# Patient Record
Sex: MALE | Race: OTHER | URBAN - METROPOLITAN AREA
[De-identification: names, ages, dates, MRNs, and addresses within clinical notes are randomized per-mention and may not be internally consistent; named-entity substitution may affect disease eponyms.]

---

## 2022-08-10 ENCOUNTER — INPATIENT (INPATIENT)
Facility: HOSPITAL | Age: 27
LOS: 4 days | Discharge: PSYCHIATRIC OR SUBSTANCE USE FACILITY | End: 2022-08-15
Attending: STUDENT IN AN ORGANIZED HEALTH CARE EDUCATION/TRAINING PROGRAM | Admitting: STUDENT IN AN ORGANIZED HEALTH CARE EDUCATION/TRAINING PROGRAM

## 2022-08-10 VITALS
TEMPERATURE: 97 F | DIASTOLIC BLOOD PRESSURE: 82 MMHG | HEART RATE: 104 BPM | OXYGEN SATURATION: 99 % | SYSTOLIC BLOOD PRESSURE: 128 MMHG | RESPIRATION RATE: 18 BRPM

## 2022-08-10 DIAGNOSIS — F32.3 MAJOR DEPRESSIVE DISORDER, SINGLE EPISODE, SEVERE WITH PSYCHOTIC FEATURES: ICD-10-CM

## 2022-08-10 LAB
ALBUMIN SERPL ELPH-MCNC: 5 G/DL — SIGNIFICANT CHANGE UP (ref 3.3–5)
ALP SERPL-CCNC: 53 U/L — SIGNIFICANT CHANGE UP (ref 40–120)
ALT FLD-CCNC: 24 U/L — SIGNIFICANT CHANGE UP (ref 4–41)
AMPHET UR-MCNC: NEGATIVE — SIGNIFICANT CHANGE UP
ANION GAP SERPL CALC-SCNC: 14 MMOL/L — SIGNIFICANT CHANGE UP (ref 7–14)
APAP SERPL-MCNC: <10 UG/ML — LOW (ref 15–25)
APPEARANCE UR: ABNORMAL
AST SERPL-CCNC: 21 U/L — SIGNIFICANT CHANGE UP (ref 4–40)
BACTERIA # UR AUTO: NEGATIVE — SIGNIFICANT CHANGE UP
BARBITURATES UR SCN-MCNC: NEGATIVE — SIGNIFICANT CHANGE UP
BASOPHILS # BLD AUTO: 0.05 K/UL — SIGNIFICANT CHANGE UP (ref 0–0.2)
BASOPHILS NFR BLD AUTO: 0.7 % — SIGNIFICANT CHANGE UP (ref 0–2)
BENZODIAZ UR-MCNC: NEGATIVE — SIGNIFICANT CHANGE UP
BILIRUB SERPL-MCNC: 0.8 MG/DL — SIGNIFICANT CHANGE UP (ref 0.2–1.2)
BILIRUB UR-MCNC: NEGATIVE — SIGNIFICANT CHANGE UP
BUN SERPL-MCNC: 8 MG/DL — SIGNIFICANT CHANGE UP (ref 7–23)
CALCIUM SERPL-MCNC: 10.2 MG/DL — SIGNIFICANT CHANGE UP (ref 8.4–10.5)
CHLORIDE SERPL-SCNC: 103 MMOL/L — SIGNIFICANT CHANGE UP (ref 98–107)
CO2 SERPL-SCNC: 24 MMOL/L — SIGNIFICANT CHANGE UP (ref 22–31)
COCAINE METAB.OTHER UR-MCNC: NEGATIVE — SIGNIFICANT CHANGE UP
COLOR SPEC: YELLOW — SIGNIFICANT CHANGE UP
CREAT SERPL-MCNC: 1.01 MG/DL — SIGNIFICANT CHANGE UP (ref 0.5–1.3)
CREATININE URINE RESULT, DAU: 385 MG/DL — SIGNIFICANT CHANGE UP
DIFF PNL FLD: NEGATIVE — SIGNIFICANT CHANGE UP
EGFR: 106 ML/MIN/1.73M2 — SIGNIFICANT CHANGE UP
EOSINOPHIL # BLD AUTO: 0.1 K/UL — SIGNIFICANT CHANGE UP (ref 0–0.5)
EOSINOPHIL NFR BLD AUTO: 1.4 % — SIGNIFICANT CHANGE UP (ref 0–6)
EPI CELLS # UR: 0 /HPF — SIGNIFICANT CHANGE UP (ref 0–5)
ETHANOL SERPL-MCNC: <10 MG/DL — SIGNIFICANT CHANGE UP
FLUAV AG NPH QL: SIGNIFICANT CHANGE UP
FLUBV AG NPH QL: SIGNIFICANT CHANGE UP
GLUCOSE SERPL-MCNC: 90 MG/DL — SIGNIFICANT CHANGE UP (ref 70–99)
GLUCOSE UR QL: NEGATIVE — SIGNIFICANT CHANGE UP
HCT VFR BLD CALC: 50.6 % — HIGH (ref 39–50)
HGB BLD-MCNC: 16.9 G/DL — SIGNIFICANT CHANGE UP (ref 13–17)
HYALINE CASTS # UR AUTO: 2 /LPF — SIGNIFICANT CHANGE UP (ref 0–7)
IANC: 4.31 K/UL — SIGNIFICANT CHANGE UP (ref 1.8–7.4)
IMM GRANULOCYTES NFR BLD AUTO: 0.1 % — SIGNIFICANT CHANGE UP (ref 0–1.5)
KETONES UR-MCNC: ABNORMAL
LEUKOCYTE ESTERASE UR-ACNC: NEGATIVE — SIGNIFICANT CHANGE UP
LYMPHOCYTES # BLD AUTO: 2.12 K/UL — SIGNIFICANT CHANGE UP (ref 1–3.3)
LYMPHOCYTES # BLD AUTO: 29.4 % — SIGNIFICANT CHANGE UP (ref 13–44)
MCHC RBC-ENTMCNC: 28.8 PG — SIGNIFICANT CHANGE UP (ref 27–34)
MCHC RBC-ENTMCNC: 33.4 GM/DL — SIGNIFICANT CHANGE UP (ref 32–36)
MCV RBC AUTO: 86.3 FL — SIGNIFICANT CHANGE UP (ref 80–100)
METHADONE UR-MCNC: NEGATIVE — SIGNIFICANT CHANGE UP
MONOCYTES # BLD AUTO: 0.61 K/UL — SIGNIFICANT CHANGE UP (ref 0–0.9)
MONOCYTES NFR BLD AUTO: 8.5 % — SIGNIFICANT CHANGE UP (ref 2–14)
NEUTROPHILS # BLD AUTO: 4.31 K/UL — SIGNIFICANT CHANGE UP (ref 1.8–7.4)
NEUTROPHILS NFR BLD AUTO: 59.9 % — SIGNIFICANT CHANGE UP (ref 43–77)
NITRITE UR-MCNC: NEGATIVE — SIGNIFICANT CHANGE UP
NRBC # BLD: 0 /100 WBCS — SIGNIFICANT CHANGE UP
NRBC # FLD: 0 K/UL — SIGNIFICANT CHANGE UP
OPIATES UR-MCNC: NEGATIVE — SIGNIFICANT CHANGE UP
OXYCODONE UR-MCNC: NEGATIVE — SIGNIFICANT CHANGE UP
PCP SPEC-MCNC: SIGNIFICANT CHANGE UP
PCP UR-MCNC: NEGATIVE — SIGNIFICANT CHANGE UP
PH UR: 7 — SIGNIFICANT CHANGE UP (ref 5–8)
PLATELET # BLD AUTO: 219 K/UL — SIGNIFICANT CHANGE UP (ref 150–400)
POTASSIUM SERPL-MCNC: 4 MMOL/L — SIGNIFICANT CHANGE UP (ref 3.5–5.3)
POTASSIUM SERPL-SCNC: 4 MMOL/L — SIGNIFICANT CHANGE UP (ref 3.5–5.3)
PROT SERPL-MCNC: 6.9 G/DL — SIGNIFICANT CHANGE UP (ref 6–8.3)
PROT UR-MCNC: ABNORMAL
RBC # BLD: 5.86 M/UL — HIGH (ref 4.2–5.8)
RBC # FLD: 12.7 % — SIGNIFICANT CHANGE UP (ref 10.3–14.5)
RBC CASTS # UR COMP ASSIST: 4 /HPF — SIGNIFICANT CHANGE UP (ref 0–4)
RSV RNA NPH QL NAA+NON-PROBE: SIGNIFICANT CHANGE UP
SALICYLATES SERPL-MCNC: <0.3 MG/DL — LOW (ref 15–30)
SARS-COV-2 RNA SPEC QL NAA+PROBE: DETECTED
SODIUM SERPL-SCNC: 141 MMOL/L — SIGNIFICANT CHANGE UP (ref 135–145)
SP GR SPEC: 1.03 — SIGNIFICANT CHANGE UP (ref 1–1.05)
THC UR QL: NEGATIVE — SIGNIFICANT CHANGE UP
TOXICOLOGY SCREEN, DRUGS OF ABUSE, SERUM RESULT: SIGNIFICANT CHANGE UP
TSH SERPL-MCNC: 0.63 UIU/ML — SIGNIFICANT CHANGE UP (ref 0.27–4.2)
UROBILINOGEN FLD QL: ABNORMAL
WBC # BLD: 7.2 K/UL — SIGNIFICANT CHANGE UP (ref 3.8–10.5)
WBC # FLD AUTO: 7.2 K/UL — SIGNIFICANT CHANGE UP (ref 3.8–10.5)
WBC UR QL: 1 /HPF — SIGNIFICANT CHANGE UP (ref 0–5)

## 2022-08-10 PROCEDURE — 93010 ELECTROCARDIOGRAM REPORT: CPT | Mod: NC

## 2022-08-10 PROCEDURE — 99285 EMERGENCY DEPT VISIT HI MDM: CPT | Mod: GC

## 2022-08-10 PROCEDURE — 99285 EMERGENCY DEPT VISIT HI MDM: CPT | Mod: 25

## 2022-08-10 NOTE — ED BEHAVIORAL HEALTH ASSESSMENT NOTE - PRIMARY DX
Current severe episode of major depressive disorder with psychotic features, unspecified whether recurrent
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
## 2022-08-10 NOTE — ED ADULT NURSE NOTE - CHIEF COMPLAINT QUOTE
Pt with HX of depression and SI. Pt states was admitted to [facility] on 8/1. then d/cd. Pt states on 8/7 tried to over dose on risperidone. pt states not feeling well depressed , states  tried to hang himself in the past. pt feeling suicidal. Pt denies drug or alcohol use.
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
## 2022-08-10 NOTE — ED ADULT NURSE NOTE - OBJECTIVE STATEMENT
Received report from CHIQUI Acuna. Pt arrives to ED  from home c/o SI with SA. Pt was recently treated at [facility] for depression and prescribed Risperidone. Pt then tried to OD on the medication on Sunday, but did not go to hospital at that time. Pt states he wants to get help so he came to ED. Pt is calm and cooperative with ED staff. Pt endorses SI with a plan, has SA in the past, but denies HI, AH/VH/TH. Belongings secured in  locker

## 2022-08-10 NOTE — ED PROVIDER NOTE - PROGRESS NOTE DETAILS
HARJINDER Schwartz- discussed the case with hospitalist who accepted pt due to covid + , at this time [facility] unable accommodate him.

## 2022-08-10 NOTE — ED ADULT TRIAGE NOTE - CHIEF COMPLAINT QUOTE
Pt with HX of depression and SI. Pt states was admitted to [facility] on 8/1. then d/cd. Pt states on 8/7 tried to over dose on risperidone. pt states not feeling well depressed , states  tried to hang himself in the past. pt feeling suicidal. Pt denies drug or alcohol use.

## 2022-08-10 NOTE — ED BEHAVIORAL HEALTH ASSESSMENT NOTE - RISK ASSESSMENT
Moderate Acute Suicide Risk Static risks include two suicide attempts. Modifiable risks include not satisfied with current outpatient treatment, hopelessness, depressive episode, auditory hallucinations (no commands), unable to safety plan. Protective factors include help seeking, future orientation, supportive family, no prior psych admissions. Pt is at an acute moderately elevated risk and chronically elevated risk of suicide and would benefit from inpatient psychiatric admission at this time.

## 2022-08-10 NOTE — ED BEHAVIORAL HEALTH NOTE - BEHAVIORAL HEALTH NOTE
As per request of provider, writer contacted patient’s mother Cami Willis([phone]), to obtain collateral information.  The following information is per the brother.  Patient is a 27 YO male domiciled w/ mother, 2 brothers (21YO, 11YO) and father. No safety concerns reported for the 11 YO. Patient BIB by mother requested by the patient. Patient reports he wanted to be in a facility to get himself better and reports feeling afraid to feel how he felt on 08/07/22. Mother reports patient attempted suicide on 08/07/2022 and ingested 9x 1mG of risperidone and 5x 2MG of risperidone. Mother reports patients family member who is a child psych came over and saw the patient and recommended Benadryl and patient didn’t appear distressed.  Mother reports patient presents extremely sad and endorses SI. Mother reports patient endorses hearing voices. Patient went to [facility] on 8/01 and was observed for 24 hours for Si and AVH. Mother reports patient is not violent or aggressive. Patient was diagnosed with manic/depression at [facility] as per the mother. Patient has no prior psych hospitalizations. Mother reports one prior suicide attempt when patient was 10 years old. Trauma includes the passing of his sister when he was 8 years old. Mother unsure if patient is currently connected to a therapist. Patient is prescribed risperidone 2mg at night from [facility]. Recent stressors include the breakup of his girlfriend who lives in [place].  Patient recently travelled to [place] end of July to see her. No medical problems reported and patient is covid vaccinated x1 as of March 2021. No recent exposure to covid as per the mother. 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 Mother reports patient’s brother is diagnosed with bipolar disorder and was recently hospitalized at [facility]. Writer informed mother that patient would be admitted and is currently boarding. Patient’s father can also be updated if the mother does not answer. (cielo Willis [phone]) As per request of provider, writer contacted patient’s mother Cami Willis([phone]), to obtain collateral information.  The following information is per the brother.  Patient is a 25 YO male domiciled w/ mother, 2 brothers (19YO, 13YO) and father. No safety concerns reported for the 13 YO. Patient BIB by mother requested by the patient. Patient reports he wanted to be in a facility to get himself better and reports feeling afraid to feel how he felt on 08/07/22. Mother reports patient attempted suicide on 08/07/2022 and ingested 9x 1mG of risperidone and 5x 2MG of risperidone. Mother reports patients family member who is a child psych came over and saw the patient and recommended Benadryl and patient didn’t appear distressed.  Mother reports patient presents extremely sad and endorses SI. Mother reports patient endorses hearing voices. Patient went to [facility] on 8/01 and was observed for 24 hours for Si and AVH. Mother reports patient is not violent or aggressive. Patient was diagnosed with manic/depression at [facility] as per the mother. Patient has no prior psych hospitalizations. Mother reports one prior suicide attempt when patient was 10 years old. Trauma includes the passing of his sister when he was 8 years old. Mother unsure if patient is currently connected to a therapist. Patient is prescribed risperidone 2mg at night from [facility]. Recent stressors include the breakup of his girlfriend who lives in [place].  Patient recently travelled to [place] end of July to see her. No medical problems reported and patient is covid vaccinated x1 as of March 2021. No recent exposure to covid as per the mother. 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 Mother reports patient’s brother is diagnosed with bipolar disorder and was recently hospitalized at [facility]. Writer informed mother that patient would be admitted and is currently boarding. Patient’s father can also be updated if the mother does not answer. (cielo Willis [phone])    Writer informed mother of patient's admission to medicine.

## 2022-08-10 NOTE — ED BEHAVIORAL HEALTH ASSESSMENT NOTE - SUICIDE ATTEMPT:
You have influenza B  1. Drink plenty of fluids including Gatorade or other salts and sugar-containing fluids - go slow at first literally 1 sip every 15 minutes for the next 2 hours, then increase as tolerated  2. If any vomiting take 1 tablet of Zofran every 8 hours, after the Zofran if you have fevers take ibuprofen 800 mg with something that culture stomach.  3. If ongoing symptoms through Monday and to follow up with PCP        Influenza  Influenza (“the flu”) is an infection that affects your respiratory tract (the mouth, nose, and lungs, and the passages between them). Unlike a cold, the flu can make you very ill. And it can lead to pneumonia, a serious lung infection. For some people, especially older adults, young children, and people with certain chronic conditions, the flu can have serious complications and even be fatal.  What Are the Risk Factors for the Flu?     Viruses that cause influenza spread through the air in droplets when someone who has the flu coughs, sneezes, laughs, or talks.   Anyone can get the flu. But you’re more likely to become infected if you:  · Have a weakened immune system.  · Work in a health care setting where you may be exposed to flu germs.  · Live or work with someone who has the flu.  · Haven’t received an annual flu shot.  How Does the Flu Spread?  The flu is caused by viruses. The viruses spread through the air in droplets when someone who has the flu coughs, sneezes, laughs, or talks. You can become infected when you inhale these viruses directly. You can also become infected when you touch a surface on which the droplets have landed and then transfer the germs to your eyes, nose, or mouth. Touching used tissues, or sharing utensils, drinking glasses, or a toothbrush with an infected person can expose you to flu viruses, too.  What Are the Symptoms of the Flu?  Flu symptoms tend to come on quickly and may last a few days to a few weeks. They include:  · Fever usually  higher than 101°F  (38.3°C) and chills  · Sore throat and headache  · Dry cough  · Runny nose  · Tiredness and weakness  · Muscle aches  Factors That Can Make Flu Worse  For some people, the flu can be very serious. The risk of complications is greater for:  · Children under age 5.  · Adults 65 years of age and older.  · People with a chronic illness, such as diabetes or heart, kidney, or lung disease.  · People who live in a nursing home or long-term care facility.   How Is the Flu Treated?  Influenza usually improves after 7 days or so. In some cases, your health care provider may prescribe an antiviral medication. This may help you get well sooner. For the medication to help, you need to take it as soon as possible (ideally within 48 hours) after your symptoms start. If you develop pneumonia or other serious illness, hospital care may be needed.  Easing Flu Symptoms  · Drink lots of fluids such as water, juice, and warm soup. A good rule is to drink enough so that you urinate your normal amount.  · Get plenty of rest.  · Ask your health care provider what to take for fever and pain.  · Call your provider if your fever rises over 101°F (38.3°C) or you become dizzy, lightheaded, or short of breath.  Taking Steps to Protect Others  · Wash your hands often, especially after coughing or sneezing. Or, clean your hands with an alcohol-based hand  containing at least 60 percent alcohol.  · Cough or sneeze into a tissue. Then throw the tissue away and wash your hands. If you don’t have a tissue, cough and sneeze into the crook of your elbow.  · Stay home until at least 24 hours after you no longer have a fever or chills. Be sure the fever isn’t being hidden by fever-reducing medication.  · Don’t share food, utensils, drinking glasses, or a toothbrush with others.  · Ask your health care provider if others in your household should receive antiviral medication to help them avoid infection.  How Can the Flu Be  Prevented?  · One of the best ways to avoid the flu is to get a flu vaccination each year. Viruses that cause the flu change from year to year. For that reason, doctors recommend getting the flu vaccine each year, as soon as it's available in your area. The vaccine may be given as a shot or as a nasal spray. Your health care provider can tell you which vaccine is right for you.  · Wash your hands often. Frequent handwashing is a proven way to help prevent infection.  · Carry an alcohol-based hand gel containing at least 60 percent alcohol. Use it when you don’t have access to soap and water. Then wash your hands as soon as you can.  · Avoid touching your eyes, nose, and mouth.  · At home and work, clean phones, computer keyboards, and toys often with disinfectant wipes.  · If possible, avoid close contact with others who have the flu or symptoms of the flu.  Handwashing Tips  Handwashing is one of the best ways to prevent many common infections. If you’re caring for or visiting someone with the flu, wash your hands each time you enter and leave the room. Follow these steps:  · Use warm water and plenty of soap. Rub your hands together well.  · Clean the whole hand, under your nails, between your fingers, and up the wrists.  · Wash for at least 15 seconds.  · Rinse, letting the water run down your fingers, not up your wrists.  · Dry your hands well. Use a paper towel to turn off the faucet and open the door.  Using Alcohol-Based Hand   Alcohol-based hand  are also a good choice. Use them when you don’t have access to soap and water. Follow these steps:  · Squeeze about a tablespoon of gel into the palm of one hand.  · Rub your hands together briskly, cleaning the backs of your hands, the palms, between your fingers, and up the wrists.  · Rub until the gel is gone and your hands are completely dry.  Preventing Influenza in Healthcare Settings  The flu is a special concern for people in hospitals and  long-term care facilities. To help prevent the spread of flu, many hospitals and nursing homes take these steps:  · Health care providers wash their hands or use an alcohol-based hand  before and after treating each patient.  · People with the flu have private rooms and bathrooms or share a room with someone with the same infection.  · High-risk patients who don’t have the flu are encouraged to get the flu and pneumonia vaccines.  · All health care workers are encouraged or required to get flu shots.      © 8613-2089 [employer]. [address] [place] [address]. All rights reserved. This information is not intended as a substitute for professional medical care. Always follow your healthcare professional's instructions.         Yes past 3 months
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
## 2022-08-10 NOTE — ED PROVIDER NOTE - CARE PLAN
Principal Discharge DX:	2019 novel coronavirus disease (COVID-19)  Secondary Diagnosis:	Current severe episode of major depressive disorder with psychotic features   1

## 2022-08-10 NOTE — ED BEHAVIORAL HEALTH ASSESSMENT NOTE - DESCRIPTION
Calm and cooperative in the ED    Vital Signs Last 24 Hrs  T(C): 36.2 (10 Aug 2022 19:32), Max: 36.2 (10 Aug 2022 19:32)  T(F): 97.2 (10 Aug 2022 19:32), Max: 97.2 (10 Aug 2022 19:32)  HR: 104 (10 Aug 2022 19:32) (104 - 104)  BP: 128/82 (10 Aug 2022 19:32) (128/82 - 128/82)  BP(mean): --  RR: 18 (10 Aug 2022 19:32) (18 - 18)  SpO2: 99% (10 Aug 2022 19:32) (99% - 99%)    Parameters below as of 10 Aug 2022 19:32  Patient On (Oxygen Delivery Method): room air denies lives with parents, works as

## 2022-08-10 NOTE — ED BEHAVIORAL HEALTH NOTE - BEHAVIORAL HEALTH NOTE
COVID Exposure Screen- Patient    1.        *Have you had a COVID-19 test in the last 90 days?  (  ) Yes   ( x ) No   (  ) Unknown- Reason: _____    IF YES PROCEED TO QUESTION #2. IF NO OR UNKNOWN, PLEASE SKIP TO QUESTION #3.    2.          Date of test(s) and result(s): ________    3.        *Have you tested positive for COVID-19 antibodies? (  ) Yes   ( x ) No   (  ) Unknown- Reason: _____    IF YES PROCEED TO QUESTION #4. IF NO or UNKNOWN, PLEASE SKIP TO QUESTION #5.    4.        Date of positive antibody test: ________    5.        *Have you received 2 doses of the COVID-19 vaccine? ( x ) Yes   (  ) No   (  ) Unknown- Reason: _____    IF YES PROCEED TO QUESTION #6. IF NO or UNKNOWN, PLEASE SKIP TO QUESTION #7.    6.        Date of second dose: ________    7.        *In the past 10 days, have you been around anyone with a positive COVID-19 test?* ( x ) Yes   (  ) No   (  ) Unknown- Reason: ____    IF YES PROCEED TO QUESTION #8. IF NO or UNKNOWN, PLEASE SKIP TO QUESTION #13.    8.        Were you within 6 feet of them for at least 15 minutes? (  ) Yes   ( x ) No   (  ) Unknown- Reason: _____    9.        Have you provided care for them? (  ) Yes   ( x ) No   (  ) Unknown- Reason: ______    10.     Have you had direct physical contact with them (touched, hugged, or kissed them)? (  ) Yes   ( x ) No    (  ) Unknown- Reason: _____    11.     Have you shared eating or drinking utensils with them? (  ) Yes   ( x ) No    (  ) Unknown- Reason: ____    12.     Have they sneezed, coughed, or somehow gotten respiratory droplets on you? (  ) Yes   ( x ) No    (  ) Unknown- Reason: ______    13.     *Have you been out of [place] within the past 10 days?* ( x ) Yes   (  ) No   (  ) Unknown- Reason: _____    IF YES PLEASE ANSWER THE FOLLOWING QUESTIONS:    14.     Which state/country have you been to? [place]    15.     Were you there over 24 hours? ( x ) Yes   (  ) No    (  ) Unknown- Reason: ______    16.     Date of return to [facility]: 7/30/22
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
## 2022-08-11 DIAGNOSIS — Z29.9 ENCOUNTER FOR PROPHYLACTIC MEASURES, UNSPECIFIED: ICD-10-CM

## 2022-08-11 DIAGNOSIS — F33.3 MAJOR DEPRESSIVE DISORDER, RECURRENT, SEVERE WITH PSYCHOTIC SYMPTOMS: ICD-10-CM

## 2022-08-11 DIAGNOSIS — U07.1 COVID-19: ICD-10-CM

## 2022-08-11 DIAGNOSIS — R45.851 SUICIDAL IDEATIONS: ICD-10-CM

## 2022-08-11 LAB
COVID-19 SPIKE DOMAIN AB INTERP: POSITIVE
COVID-19 SPIKE DOMAIN ANTIBODY RESULT: >250 U/ML — HIGH
D DIMER BLD IA.RAPID-MCNC: 761 NG/ML DDU — HIGH
SARS-COV-2 IGG+IGM SERPL QL IA: >250 U/ML — HIGH
SARS-COV-2 IGG+IGM SERPL QL IA: POSITIVE

## 2022-08-11 PROCEDURE — 71045 X-RAY EXAM CHEST 1 VIEW: CPT | Mod: 26

## 2022-08-11 PROCEDURE — 12345: CPT | Mod: NC

## 2022-08-11 PROCEDURE — 99223 1ST HOSP IP/OBS HIGH 75: CPT | Mod: GC

## 2022-08-11 PROCEDURE — 93010 ELECTROCARDIOGRAM REPORT: CPT

## 2022-08-11 PROCEDURE — 99233 SBSQ HOSP IP/OBS HIGH 50: CPT

## 2022-08-11 RX ORDER — ENOXAPARIN SODIUM 100 MG/ML
80 INJECTION SUBCUTANEOUS EVERY 12 HOURS
Refills: 0 | Status: DISCONTINUED | OUTPATIENT
Start: 2022-08-11 | End: 2022-08-11

## 2022-08-11 RX ORDER — DIPHENHYDRAMINE HCL 50 MG
50 CAPSULE ORAL EVERY 6 HOURS
Refills: 0 | Status: DISCONTINUED | OUTPATIENT
Start: 2022-08-11 | End: 2022-08-15

## 2022-08-11 RX ORDER — ENOXAPARIN SODIUM 100 MG/ML
40 INJECTION SUBCUTANEOUS EVERY 24 HOURS
Refills: 0 | Status: DISCONTINUED | OUTPATIENT
Start: 2022-08-11 | End: 2022-08-11

## 2022-08-11 RX ORDER — ACETAMINOPHEN 500 MG
650 TABLET ORAL EVERY 6 HOURS
Refills: 0 | Status: DISCONTINUED | OUTPATIENT
Start: 2022-08-11 | End: 2022-08-15

## 2022-08-11 RX ORDER — LANOLIN ALCOHOL/MO/W.PET/CERES
3 CREAM (GRAM) TOPICAL AT BEDTIME
Refills: 0 | Status: DISCONTINUED | OUTPATIENT
Start: 2022-08-11 | End: 2022-08-12

## 2022-08-11 RX ORDER — ENOXAPARIN SODIUM 100 MG/ML
40 INJECTION SUBCUTANEOUS EVERY 12 HOURS
Refills: 0 | Status: DISCONTINUED | OUTPATIENT
Start: 2022-08-11 | End: 2022-08-13

## 2022-08-11 RX ADMIN — ENOXAPARIN SODIUM 40 MILLIGRAM(S): 100 INJECTION SUBCUTANEOUS at 17:48

## 2022-08-11 RX ADMIN — Medication 1 MILLIGRAM(S): at 22:06

## 2022-08-11 RX ADMIN — Medication 3 MILLIGRAM(S): at 22:06

## 2022-08-11 NOTE — H&P ADULT - NSICDXFAMILYHX_GEN_ALL_CORE_FT
FAMILY HISTORY:  Sibling  Still living? Unknown  Family history of bipolar disorder, Age at diagnosis: Age Unknown

## 2022-08-11 NOTE — PATIENT PROFILE ADULT - FALL HARM RISK - UNIVERSAL INTERVENTIONS
Bed in lowest position, wheels locked, appropriate side rails in place/Call bell, personal items and telephone in reach/Instruct patient to call for assistance before getting out of bed or chair/Non-slip footwear when patient is out of bed/[place] to call system/Physically safe environment - no spills, clutter or unnecessary equipment/Purposeful Proactive Rounding/Room/bathroom lighting operational, light cord in reach

## 2022-08-11 NOTE — H&P ADULT - NSHPREVIEWOFSYSTEMS_GEN_ALL_CORE
Constitutional: denies weight loss, fatigue, fevers, chills  Skin: denies rashes  HEENT: no vision or hearing changes  CV: denies BRAXTON or YANI  Resp: denies SOB or cough  GI: denies diarrhea, constipation, N/V, or changes to appetite  Urinary: denies urgency, dysuria  Neurologic: denies headache or pain  MSK: denies arthralgias or myalgias  Hematologic: denies bleeding or easy bruising  Lymphatics: denies LAD Constitutional: denies weight loss, fatigue, fevers, chills  Skin: denies rashes, ulcers  HEENT: no vision or hearing changes  CV: denies BRAXTON or YANI or CP  Resp: denies SOB or cough  GI: denies diarrhea, constipation, N/V, or changes to appetite  Urinary: denies urgency, dysuria  Neurologic: denies headache or LOC  MSK: denies arthralgias or myalgias  Hematologic: denies bleeding or easy bruising  Lymphatics: denies LAD
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
## 2022-08-11 NOTE — H&P ADULT - PROBLEM SELECTOR PLAN 3
DVT PPx: Improve Score low, encouraged ambulation/activity as possible  Diet: Regular  Code: Full code  Dispo: PT/OT Pending Hospital Course  Communication: mother (Cami [phone]), brother (Miguel [phone]) DVT PPx: Lovenox 40mg qD  Diet: Regular  Code: Full code  Dispo: no PT needed  Communication: mother (Cami [phone]), brother (Miguel [phone]) Prior contact at [place] 8/1 with 3 Covid+ patients. Asymptomatic at this time. Covid vax x1 3/2021.  - Not at high risk of progressing to severe COVID19 therefore not requiring Paxlovid or Remdesivir  - CTM for respiratory status/symptoms  - D-dimer AM labs  - CXR for baseline  - lovenox

## 2022-08-11 NOTE — BH CONSULTATION LIAISON PROGRESS NOTE - NSICDXBHSECONDARYDX_PSY_ALL_CORE
Current severe episode of major depressive disorder with psychotic features, unspecified whether recurrent   F32.3  Severe recurrent major depressive disorder with psychotic features   F33.3  Asymptomatic COVID-19 virus infection   U07.1  Prophylactic measure   Z29.9  Suicidal ideation   R45.851   Severe recurrent major depressive disorder with psychotic features   F33.3

## 2022-08-11 NOTE — H&P ADULT - PROBLEM SELECTOR PLAN 2
Prior contact at [place] 8/1 with 3 Covid+ patients. Asymptomatic at this time. Covid vax x1 3/2021.  - Not at high risk of progressing to severe COVID19 therefore not requiring Paxlovid or Remdesivir  - CTM for respiratory status/symptoms Prior contact at [place] 8/1 with 3 Covid+ patients. Asymptomatic at this time. Covid vax x1 3/2021.  - Not at high risk of progressing to severe COVID19 therefore not requiring Paxlovid or Remdesivir  - CTM for respiratory status/symptoms  - D-dimer AM labs  - CXR for baseline suicidal attempt with risperidone 8/7. No further side effects at this time  -constant observation  -psych following

## 2022-08-11 NOTE — H&P ADULT - NSHPLABSRESULTS_GEN_ALL_CORE
16.9   7.20  )-----------( 219      ( 10 Aug 2022 20:35 )             50.6     08-10    141  |  103  |  8   ----------------------------<  90  4.0   |  24  |  1.01    Ca    10.2      10 Aug 2022 20:35    TPro  6.9  /  Alb  5.0  /  TBili  0.8  /  DBili  x   /  AST  21  /  ALT  24  /  AlkPhos  53  08-10          Urinalysis Basic - ( 10 Aug 2022 20:35 )    Color: Yellow / Appearance: Slightly Turbid / S.030 / pH: x  Gluc: x / Ketone: Moderate  / Bili: Negative / Urobili: 6 mg/dL   Blood: x / Protein: 30 mg/dL / Nitrite: Negative   Leuk Esterase: Negative / RBC: 4 /HPF / WBC 1 /HPF   Sq Epi: x / Non Sq Epi: 0 /HPF / Bacteria: Negative 16.9   7.20  )-----------( 219      ( 10 Aug 2022 20:35 )             50.6     08-10    141  |  103  |  8   ----------------------------<  90  4.0   |  24  |  1.01    Ca    10.2      10 Aug 2022 20:35    TPro  6.9  /  Alb  5.0  /  TBili  0.8  /  DBili  x   /  AST  21  /  ALT  24  /  AlkPhos  53  08-10      Urinalysis Basic - ( 10 Aug 2022 20:35 )    Color: Yellow / Appearance: Slightly Turbid / S.030 / pH: x  Gluc: x / Ketone: Moderate  / Bili: Negative / Urobili: 6 mg/dL   Blood: x / Protein: 30 mg/dL / Nitrite: Negative   Leuk Esterase: Negative / RBC: 4 /HPF / WBC 1 /HPF   Sq Epi: x / Non Sq Epi: 0 /HPF / Bacteria: Negative      Additional testing:  ECG: NSR, NAD, RI and QRS intervals wnl, no acute morphologic changes noted. No prior EKG for comparison. Labs reviewed by me:    16.9   7.20  )-----------( 219      ( 10 Aug 2022 20:35 )             50.6     08-10    141  |  103  |  8   ----------------------------<  90  4.0   |  24  |  1.01    Ca    10.2      10 Aug 2022 20:35    TPro  6.9  /  Alb  5.0  /  TBili  0.8  /  DBili  x   /  AST  21  /  ALT  24  /  AlkPhos  53  08-10      Urinalysis Basic - ( 10 Aug 2022 20:35 )    Color: Yellow / Appearance: Slightly Turbid / S.030 / pH: x  Gluc: x / Ketone: Moderate  / Bili: Negative / Urobili: 6 mg/dL   Blood: x / Protein: 30 mg/dL / Nitrite: Negative   Leuk Esterase: Negative / RBC: 4 /HPF / WBC 1 /HPF   Sq Epi: x / Non Sq Epi: 0 /HPF / Bacteria: Negative      Additional testing:  ECG personally reviewed and interpreted: NSR, NV and QRS intervals wnl, no acute morphologic changes noted. No prior EKG for comparison.    CXR ordered and pending

## 2022-08-11 NOTE — PATIENT PROFILE ADULT - FALL HARM RISK - PATIENT NEEDS ASSISTANCE
Recent blood work done, they provided all the labs as below and they are normal. So no need to repeat those labs.   Fasting blood sugar  Total Cholesterol  LDL  HDL   No assistance needed

## 2022-08-11 NOTE — PROGRESS NOTE ADULT - SUBJECTIVE AND OBJECTIVE BOX
[facility] Division of Hospital Medicine  Roya Mayes DO  Available via MS Teams  In house pager [phone]     SUBJECTIVE / OVERNIGHT EVENTS:     ADDITIONAL REVIEW OF SYSTEMS:    MEDICATIONS  (STANDING):  enoxaparin Injectable 80 milliGRAM(s) SubCutaneous every 12 hours  melatonin 3 milliGRAM(s) Oral at bedtime    MEDICATIONS  (PRN):  acetaminophen     Tablet .. 650 milliGRAM(s) Oral every 6 hours PRN Temp greater or equal to 38C (100.4F), Mild Pain (1 - 3)      I&O's Summary    10 Aug 2022 07:01  -  11 Aug 2022 07:00  --------------------------------------------------------  IN: 200 mL / OUT: 0 mL / NET: 200 mL        PHYSICAL EXAM:  Vital Signs Last 24 Hrs  T(C): 36.2 (11 Aug 2022 13:43), Max: 36.7 (11 Aug 2022 01:47)  T(F): 97.2 (11 Aug 2022 13:43), Max: 98 (11 Aug 2022 01:47)  HR: 89 (11 Aug 2022 13:43) (89 - 104)  BP: 113/65 (11 Aug 2022 13:43) (109/78 - 147/90)  BP(mean): --  RR: 17 (11 Aug 2022 13:43) (16 - 18)  SpO2: 100% (11 Aug 2022 13:43) (99% - 100%)    Parameters below as of 11 Aug 2022 13:43  Patient On (Oxygen Delivery Method): room air      CONSTITUTIONAL: NAD, well-developed, well-groomed  EYES: PERRLA; conjunctiva and sclera clear  ENMT: Moist oral mucosa, no pharyngeal injection or exudates; normal dentition  NECK: Supple, no palpable masses; no thyromegaly  RESPIRATORY: Normal respiratory effort; lungs are clear to auscultation bilaterally  CARDIOVASCULAR: Regular rate and rhythm, normal S1 and S2, no murmur/rub/gallop; No lower extremity edema; Peripheral pulses are 2+ bilaterally  ABDOMEN: Nontender to palpation, normoactive bowel sounds, no rebound/guarding; No hepatosplenomegaly  MUSCULOSKELETAL:  Normal gait; no clubbing or cyanosis of digits; no joint swelling or tenderness to palpation  PSYCH: A+O to person, place, and time; affect appropriate  NEUROLOGY: CN 2-12 are intact and symmetric; no gross sensory deficits   SKIN: No rashes; no palpable lesions    LABS:                        16.9   7.20  )-----------( 219      ( 10 Aug 2022 20:35 )             50.6     08-10    141  |  103  |  8   ----------------------------<  90  4.0   |  24  |  1.01    Ca    10.2      10 Aug 2022 20:35    TPro  6.9  /  Alb  5.0  /  TBili  0.8  /  DBili  x   /  AST  21  /  ALT  24  /  AlkPhos  53  08-10          Urinalysis Basic - ( 10 Aug 2022 20:35 )    Color: Yellow / Appearance: Slightly Turbid / S.030 / pH: x  Gluc: x / Ketone: Moderate  / Bili: Negative / Urobili: 6 mg/dL   Blood: x / Protein: 30 mg/dL / Nitrite: Negative   Leuk Esterase: Negative / RBC: 4 /HPF / WBC 1 /HPF   Sq Epi: x / Non Sq Epi: 0 /HPF / Bacteria: Negative            RADIOLOGY & ADDITIONAL TESTS:  New Results Reviewed Today:   New Imaging Personally Reviewed Today:  New Electrocardiogram Personally Reviewed Today:  Prior or Outpatient Records Reviewed Today:    COMMUNICATION:  Care Discussed with Consultants/Other Providers and Details of Discussion:  Discussions with Patient/Family:  PCP Communication:     [facility] Division of Hospital Medicine  Roya Mayes DO  Available via MS Teams  In house pager [phone]     SUBJECTIVE / OVERNIGHT EVENTS: Pt asleep when entering room, states that he didn't get a single minute of sleep last night.  Describes that this was the first attempt that he carried out for suicide, later found out that his girlfriend broke up with him recently which also worsened his depression.  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  COVID wise, he denies fevers/chills, cough/SOB, CP       MEDICATIONS  (STANDING):  enoxaparin Injectable 80 milliGRAM(s) SubCutaneous every 12 hours  melatonin 3 milliGRAM(s) Oral at bedtime    MEDICATIONS  (PRN):  acetaminophen     Tablet .. 650 milliGRAM(s) Oral every 6 hours PRN Temp greater or equal to 38C (100.4F), Mild Pain (1 - 3)      I&O's Summary    10 Aug 2022 07:01  -  11 Aug 2022 07:00  --------------------------------------------------------  IN: 200 mL / OUT: 0 mL / NET: 200 mL        PHYSICAL EXAM:  Vital Signs Last 24 Hrs  T(C): 36.2 (11 Aug 2022 13:43), Max: 36.7 (11 Aug 2022 01:47)  T(F): 97.2 (11 Aug 2022 13:43), Max: 98 (11 Aug 2022 01:47)  HR: 89 (11 Aug 2022 13:43) (89 - 104)  BP: 113/65 (11 Aug 2022 13:43) (109/78 - 147/90)  BP(mean): --  RR: 17 (11 Aug 2022 13:43) (16 - 18)  SpO2: 100% (11 Aug 2022 13:43) (99% - 100%)    Parameters below as of 11 Aug 2022 13:43  Patient On (Oxygen Delivery Method): room air      CONSTITUTIONAL: NAD, well-developed, well-groomed  EYES: PERRLA; conjunctiva and sclera clear  RESPIRATORY: Normal respiratory effort; lungs are clear to auscultation bilaterally  CARDIOVASCULAR: Regular rate and rhythm, normal S1 and S2, no murmur/rub/gallop; No lower extremity edema; Peripheral pulses are 2+ bilaterally  ABDOMEN: Nontender to palpation, normoactive bowel sounds, no rebound/guarding  MUSCULOSKELETAL:  No clubbing or cyanosis of digits; no joint swelling or tenderness to palpation  PSYCH: A+O to person, place, and time; flat affect  NEUROLOGY: CN 2-12 are intact and symmetric; no gross sensory deficits   SKIN: No rashes; no palpable lesions    LABS:                        16.9   7.20  )-----------( 219      ( 10 Aug 2022 20:35 )             50.6     08-10    141  |  103  |  8   ----------------------------<  90  4.0   |  24  |  1.01    Ca    10.2      10 Aug 2022 20:35    TPro  6.9  /  Alb  5.0  /  TBili  0.8  /  DBili  x   /  AST  21  /  ALT  24  /  AlkPhos  53  08-10        Urinalysis Basic - ( 10 Aug 2022 20:35 )    Color: Yellow / Appearance: Slightly Turbid / S.030 / pH: x  Gluc: x / Ketone: Moderate  / Bili: Negative / Urobili: 6 mg/dL   Blood: x / Protein: 30 mg/dL / Nitrite: Negative   Leuk Esterase: Negative / RBC: 4 /HPF / WBC 1 /HPF   Sq Epi: x / Non Sq Epi: 0 /HPF / Bacteria: Negative    D-Dimer Assay, Quantitative (22 @ 06:50)    D-Dimer Assay, Quantitative: 761: A result less than 230 ng/mL DDU correlates with the absence of  thrombosis in a patient with low and moderate pre-test probability of  thrombosis.  Note: 1DDU is approximately equal to 2ng/mL FEU (previous unit).  If you have any questions, please contact Hematology Lab at ext. [phone]. ng/mL DDU          COMMUNICATION:  Care Discussed with Consultants/Other Providers and Details of Discussion:  Dr. Zapata  Discussions with Patient/Family: Mother Cami

## 2022-08-11 NOTE — H&P ADULT - ATTENDING COMMENTS
26M hx of MDD w/ psychotic features, PTSD, and recent suicide attempt admitted for asymptomatic COVID19+ status seeking transfer into [facility] for depression and SI treatment.    #Depression with psychotic features  Has AH without command hallucinations. Has SI and recent SA on 8/7. Amenable to admission to inpatient psych however COVID positive and [facility] unable to take at this time  -psych following  -constant observation    #Suicidal ideation  SA with risperidone. No rigidity or clonus on exam  -constant obs  -psych following    #COVID  Asymptomatic, not hypoxic. EKG NSR  -check D-dimer, CXR  -lovenox    #Prophylactic measure  Lovenox   Regular deit

## 2022-08-11 NOTE — PATIENT PROFILE ADULT - NSPROPOAPRESSUREINJURY_GEN_A_NUR
Last seen 10/02/2017, 'PLAN: Continue current cardiac medications.', with no future appointments scheduled; therefore, both prescriptions were e prescribed to patient's mail order pharmacy per refill protocol. Patient given 90 day supply with no refills with message to: Final script; for Future medication have [place] call [phone] to schedule an office visit with Dr Kruger.   no

## 2022-08-11 NOTE — H&P ADULT - NSHPPHYSICALEXAM_GEN_ALL_CORE
GENERAL: well-appearing, NAD, appears comfortable  HEENT: NC/AT, EOMI, no conjunctival icterus, moist mucus membranes  NECK: supple, non-tender, no JVD, no LAD  LUNGS: non-labored breathing, CTAB, no wheezing/rales/rhonchi  CV: RRR, no m/r/g, 2+ palpable peripheral pulses bi/l, cap refill <2 secs  ABD: non-distended, soft, non-tender, no rebound tenderness or guarding  MSK: full ROM, strength 5/5 bi/l  EXT: warm and well-perfused, no peripheral edema  SKIN: no rashes or lesions  NEURO: AAOx4, no focal deficits  PSYCH: depressed, normal affect, forward thinking, denies SI/HO or AH/VH currently GENERAL: well-appearing, NAD, appears comfortable, well-developed  HEENT: NC/AT, EOMI, no conjunctival icterus, moist mucus membranes  NECK: supple, non-tender, no JVD, no LAD  LUNGS: non-labored breathing, CTAB, no wheezing/rales/rhonchi  CV: RRR, no m/r/g, 2+ palpable peripheral pulses bi/l, cap refill <2 secs, no LE edema  ABD: non-distended, soft, non-tender, no rebound tenderness or guarding  MSK: full ROM, strength 5/5 bi/l  EXT: warm and well-perfused, no peripheral edema  SKIN: no rashes or lesions  NEURO: AAOx4, no focal deficits  PSYCH: depressed, normal affect, forward thinking, denies SI/HO or AH/VH currently

## 2022-08-11 NOTE — H&P ADULT - ASSESSMENT
26M hx of MDD w/ psychotic features, PTSD, and recent suicide attempt admitted for asymptomatic COVID19+ status seeking transfer into [facility] for depression treatment. 26M hx of MDD w/ psychotic features, PTSD, and recent suicide attempt admitted for asymptomatic COVID19+ status seeking transfer into [facility] for depression and SI treatment.

## 2022-08-11 NOTE — H&P ADULT - PROBLEM SELECTOR PLAN 4
DVT PPx: Lovenox 40mg qD  Diet: Regular  Code: Full code  Dispo: no PT needed  Communication: mother (Cami [phone]), brother (Miguel [phone])

## 2022-08-11 NOTE — BH CONSULTATION LIAISON PROGRESS NOTE - NSBHFUPINTERVALHXFT_PSY_A_CORE
Patient seen and evaluated, remains on CO for SI. Chart reviewed, case discussed . No psych PRN, Vitals stable.  Patient shares similar account as detailed in initial assessment. Reports he has been experiencing ongoing depressive symptoms including SI since February of this year, leading to a self aborted suicide attempt via trying to hang himself. Has not been hospitalized psychiatrically. Has only gone to [place] for treatment where he was never admitted and discharged on risperdal which is the same medication his brother with bipolar d/o takes.   Patient states that he was tired of feeling pain he has been carrying for so long and did not want to suffer any longer. But at the same time he did not want to die "I just wanted to put myself in a coma". He is happy that he lived at this time and currently denying SI.   He does report AH but reports that they are his own voice and make negative comments about himself. Currently denying AH.   In regard to overdose, patient reports he experienced muscle stiffness throughout his body, has taken benadryl throughout the week instructed by a family friend who is a psychiatrist. Overnight last night experienced some back stiffness/pain.     WIth regard to depressive symptoms, endorses depressed mood, impaired sleep, low energy. Denying manic and psychotic symptoms.

## 2022-08-11 NOTE — H&P ADULT - NSHPSOCIALHISTORY_GEN_ALL_CORE
Home: Domiciled with parents  Work:   ADL: Full ADL   Alcohol: socially  Smoking: denies   Drugs: marijuana 12/2021, none otherwise
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
## 2022-08-11 NOTE — H&P ADULT - PROBLEM SELECTOR PLAN 1
Progressively worsening depressive symptoms since 2/2022 with AH/VHs. No prior medical therapy, has had prior counseling in remote past given childhood traumas. Recently inpatient at [facility] 8/1-8/2 seeking treatment, discharged with Risperdal subsequently used for suicide attempt. Patient seeking self-admission to [facility] but Covid+ and pending bed at this time. Remote marijuana use (12/2021). Utox neg.  - plan to transfer/admit to [facility] when feasible given Covid+ status  - Psych following, recommending inpatient admission given patient's risk to self  - Not requiring 1:1 obvs  - melatonin 3mg Progressively worsening depressive symptoms since 2/2022 with AH/VHs. No prior medical therapy, has had prior counseling in remote past given childhood traumas. Recently inpatient at [facility] 8/1-8/2 seeking treatment, discharged with Risperdal subsequently used for suicide attempt. Patient seeking self-admission to [facility] but Covid+ and pending bed at this time. Remote marijuana use (12/2021). Utox neg.  - plan to transfer/admit to [facility] when feasible given Covid+ status  - Psych following, recommending inpatient admission given patient's risk to self  - 1:1 obvs while inpatient at [facility]  - melatonin 3mg

## 2022-08-11 NOTE — BH CONSULTATION LIAISON PROGRESS NOTE - OTHER
Bedside and Written shift change report given to Lily Lowry RN (oncoming nurse) by Abram Catherine RN (offgoing nurse). Report included the following information SBAR, Kardex, Intake/Output, MAR, Accordion, Recent Results, Med Rec Status and Cardiac Rhythm AV pace. Oddly related  Serious appearing  s/p SA

## 2022-08-12 LAB
24R-OH-CALCIDIOL SERPL-MCNC: 28.8 NG/ML — LOW (ref 30–80)
ALBUMIN SERPL ELPH-MCNC: 5 G/DL — SIGNIFICANT CHANGE UP (ref 3.3–5)
ALP SERPL-CCNC: 53 U/L — SIGNIFICANT CHANGE UP (ref 40–120)
ALT FLD-CCNC: 24 U/L — SIGNIFICANT CHANGE UP (ref 4–41)
ANION GAP SERPL CALC-SCNC: 13 MMOL/L — SIGNIFICANT CHANGE UP (ref 7–14)
AST SERPL-CCNC: 18 U/L — SIGNIFICANT CHANGE UP (ref 4–40)
BASOPHILS # BLD AUTO: 0.08 K/UL — SIGNIFICANT CHANGE UP (ref 0–0.2)
BASOPHILS NFR BLD AUTO: 1 % — SIGNIFICANT CHANGE UP (ref 0–2)
BILIRUB SERPL-MCNC: 0.9 MG/DL — SIGNIFICANT CHANGE UP (ref 0.2–1.2)
BUN SERPL-MCNC: 10 MG/DL — SIGNIFICANT CHANGE UP (ref 7–23)
CALCIUM SERPL-MCNC: 10.2 MG/DL — SIGNIFICANT CHANGE UP (ref 8.4–10.5)
CHLORIDE SERPL-SCNC: 98 MMOL/L — SIGNIFICANT CHANGE UP (ref 98–107)
CO2 SERPL-SCNC: 25 MMOL/L — SIGNIFICANT CHANGE UP (ref 22–31)
CREAT SERPL-MCNC: 1 MG/DL — SIGNIFICANT CHANGE UP (ref 0.5–1.3)
EGFR: 108 ML/MIN/1.73M2 — SIGNIFICANT CHANGE UP
EOSINOPHIL # BLD AUTO: 0.23 K/UL — SIGNIFICANT CHANGE UP (ref 0–0.5)
EOSINOPHIL NFR BLD AUTO: 2.9 % — SIGNIFICANT CHANGE UP (ref 0–6)
FERRITIN SERPL-MCNC: 223 NG/ML — SIGNIFICANT CHANGE UP (ref 30–400)
FOLATE SERPL-MCNC: 17.2 NG/ML — SIGNIFICANT CHANGE UP (ref 3.1–17.5)
GLUCOSE SERPL-MCNC: 90 MG/DL — SIGNIFICANT CHANGE UP (ref 70–99)
HCT VFR BLD CALC: 54.9 % — HIGH (ref 39–50)
HGB BLD-MCNC: 17.7 G/DL — HIGH (ref 13–17)
IANC: 2.93 K/UL — SIGNIFICANT CHANGE UP (ref 1.8–7.4)
IMM GRANULOCYTES NFR BLD AUTO: 0.3 % — SIGNIFICANT CHANGE UP (ref 0–1.5)
LDH SERPL L TO P-CCNC: 175 U/L — SIGNIFICANT CHANGE UP (ref 135–225)
LYMPHOCYTES # BLD AUTO: 3.81 K/UL — HIGH (ref 1–3.3)
LYMPHOCYTES # BLD AUTO: 48.5 % — HIGH (ref 13–44)
MAGNESIUM SERPL-MCNC: 2.3 MG/DL — SIGNIFICANT CHANGE UP (ref 1.6–2.6)
MCHC RBC-ENTMCNC: 27.8 PG — SIGNIFICANT CHANGE UP (ref 27–34)
MCHC RBC-ENTMCNC: 32.2 GM/DL — SIGNIFICANT CHANGE UP (ref 32–36)
MCV RBC AUTO: 86.3 FL — SIGNIFICANT CHANGE UP (ref 80–100)
MONOCYTES # BLD AUTO: 0.79 K/UL — SIGNIFICANT CHANGE UP (ref 0–0.9)
MONOCYTES NFR BLD AUTO: 10.1 % — SIGNIFICANT CHANGE UP (ref 2–14)
NEUTROPHILS # BLD AUTO: 2.93 K/UL — SIGNIFICANT CHANGE UP (ref 1.8–7.4)
NEUTROPHILS NFR BLD AUTO: 37.2 % — LOW (ref 43–77)
NRBC # BLD: 0 /100 WBCS — SIGNIFICANT CHANGE UP
NRBC # FLD: 0 K/UL — SIGNIFICANT CHANGE UP
PHOSPHATE SERPL-MCNC: 5 MG/DL — HIGH (ref 2.5–4.5)
PLATELET # BLD AUTO: 223 K/UL — SIGNIFICANT CHANGE UP (ref 150–400)
POTASSIUM SERPL-MCNC: 4.2 MMOL/L — SIGNIFICANT CHANGE UP (ref 3.5–5.3)
POTASSIUM SERPL-SCNC: 4.2 MMOL/L — SIGNIFICANT CHANGE UP (ref 3.5–5.3)
PROCALCITONIN SERPL-MCNC: 0.05 NG/ML — SIGNIFICANT CHANGE UP (ref 0.02–0.1)
PROT SERPL-MCNC: 7.8 G/DL — SIGNIFICANT CHANGE UP (ref 6–8.3)
RBC # BLD: 6.36 M/UL — HIGH (ref 4.2–5.8)
RBC # FLD: 12.6 % — SIGNIFICANT CHANGE UP (ref 10.3–14.5)
SARS-COV-2 RNA SPEC QL NAA+PROBE: SIGNIFICANT CHANGE UP
SODIUM SERPL-SCNC: 136 MMOL/L — SIGNIFICANT CHANGE UP (ref 135–145)
TSH SERPL-MCNC: 0.9 UIU/ML — SIGNIFICANT CHANGE UP (ref 0.27–4.2)
VIT B12 SERPL-MCNC: 724 PG/ML — SIGNIFICANT CHANGE UP (ref 200–900)
VIT D25+D1,25 OH+D1,25 PNL SERPL-MCNC: 44.7 PG/ML — SIGNIFICANT CHANGE UP (ref 19.9–79.3)
WBC # BLD: 7.86 K/UL — SIGNIFICANT CHANGE UP (ref 3.8–10.5)
WBC # FLD AUTO: 7.86 K/UL — SIGNIFICANT CHANGE UP (ref 3.8–10.5)

## 2022-08-12 PROCEDURE — 99233 SBSQ HOSP IP/OBS HIGH 50: CPT

## 2022-08-12 RX ORDER — LANOLIN ALCOHOL/MO/W.PET/CERES
3 CREAM (GRAM) TOPICAL
Refills: 0 | Status: DISCONTINUED | OUTPATIENT
Start: 2022-08-12 | End: 2022-08-15

## 2022-08-12 RX ORDER — RISPERIDONE 4 MG/1
1 TABLET ORAL AT BEDTIME
Refills: 0 | Status: DISCONTINUED | OUTPATIENT
Start: 2022-08-12 | End: 2022-08-15

## 2022-08-12 RX ADMIN — Medication 3 MILLIGRAM(S): at 20:55

## 2022-08-12 RX ADMIN — RISPERIDONE 1 MILLIGRAM(S): 4 TABLET ORAL at 20:55

## 2022-08-12 RX ADMIN — ENOXAPARIN SODIUM 40 MILLIGRAM(S): 100 INJECTION SUBCUTANEOUS at 05:28

## 2022-08-12 RX ADMIN — ENOXAPARIN SODIUM 40 MILLIGRAM(S): 100 INJECTION SUBCUTANEOUS at 17:37

## 2022-08-12 NOTE — DISCHARGE NOTE PROVIDER - NSDCFUADDAPPT_GEN_ALL_CORE_FT
Follow up with your primary doctor in 1 week   Follow up with Psychiatrist in during 1-3 days   after discharge from hospital
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
## 2022-08-12 NOTE — DISCHARGE NOTE PROVIDER - NSDCCPCAREPLAN_GEN_ALL_CORE_FT
PRINCIPAL DISCHARGE DIAGNOSIS  Diagnosis: Asymptomatic COVID-19 virus infection  Assessment and Plan of Treatment: YOU ARE TO BE QUARANTINED for 10 days from 8/10.   To prevent spread, please stay home and do not leave except to get medical care. Do not visit public areas. Avoid using public transportation, ride-sharing or taxis. Stay away from others as much as possible. "If this is not possible, try and keep a safe distance from other people (at least 6 feet away). Use a separate bathroom if available. Cover your mouth and nose with a tissue when you cough or sneeze. Wash your hands immediately after for at least 20 seconds. Wash your hands often. This is important after blowing your nose, coughing, sneezing, going to the bathroom, before eating or after preparing food. Avoid sharing dishes, drinking glasses, cups, eating utensils, towels or bedding with other people in your home. Clean and disinfect all high-touch surfaces everyday. High touch surfaces include phones, remote controls, counters, tabletops, doorknobs, bathroom fixtures, toilets, keyboards, tablets, and bedside tables.   If you develop emergency warning signs, such as high grade fever, shortness of breath, difficulty breathing, persistent pain, pressure in the chest, new confusion, bluish lips, or if any of your symptoms begin to worsen, call your medical provider or return to the nearest emergency department.        SECONDARY DISCHARGE DIAGNOSES  Diagnosis: Current severe episode of major depressive disorder with psychotic features  Assessment and Plan of Treatment: You were admitted with severe depression and evaluated in the hospital. Continue your medications as directed and follow up with your PCP and psychiatrist for further evaluation and medical management. If you are ever in need of immediate psychiatric assistance you may reach out to the [facility] [phone]      Diagnosis: Suicidal ideation  Assessment and Plan of Treatment: If you feel unhappy, depressed, anxious, fearful, [place], or in need of emotional help, call your Please follow up with your primary care physician regarding your hospitalization, Psychiatrist or 911.  Follow Psych recommendations, continue current medications as prescribed,       PRINCIPAL DISCHARGE DIAGNOSIS  Diagnosis: Current severe episode of major depressive disorder with psychotic features  Assessment and Plan of Treatment: Progressively worsening depressive symptoms since 2/2022 with /VHs. No prior medical therapy, has had prior counseling in remote past given childhood traumas. Recently inpatient at [facility] 8/1-8/2 seeking treatment, discharged with Risperdal subsequently used for suicide attempt. Patient seeking self-admission to [facility] but initially Covid+ but now repeat x 3 are negative.  Remote marijuana use (12/2021). Utox neg.  - Psych following, recommending inpatient admission given patient's risk to self  - 1:1 observation while inpatient at [facility]  - Melatonin 3mg.  Suicidal ideation.    suicidal attempt with risperidone 8/7. No further side effects at this time  -constant observation  -Care d/w psych: OK to resume risperdal 1mg   - PRN Ativan for agitation, PRN Benadryl for spasms.  -8/14 qtc 392            SECONDARY DISCHARGE DIAGNOSES  Diagnosis: 2019 novel coronavirus disease (COVID-19)  Assessment and Plan of Treatment: Asymptomatic COVID-19 virus infection.    Likely a false + admission though had prior contact at [facility] 8/1 with 3 Covid+ patients.  Asymptomatic at this time. Covid vax x1 3/2021.  tested negative for COVID x3

## 2022-08-12 NOTE — DISCHARGE NOTE PROVIDER - PROVIDER TOKENS
FREE:[LAST:[Your prmary],FIRST:[doctor],PHONE:[(   )    -],FAX:[(   )    -],ADDRESS:[follow up in 1 week]]

## 2022-08-12 NOTE — DISCHARGE NOTE PROVIDER - NSFOLLOWUPCLINICS_GEN_ALL_ED_FT
[facility]  Psychiatry  [address] [place] [address]  Phone: [phone]  Fax:      [facility]  Psychiatry  [address] [place] [address]  Phone: [phone]  Fax:     [facility]  Internal Medicine  [address] [place] [address]  Phone: [phone]  Fax: [phone]

## 2022-08-12 NOTE — DISCHARGE NOTE PROVIDER - HOSPITAL COURSE
26M hx of MDD w/ psychotic features, PTSD, and recent suicide attempt admitted for asymptomatic COVID19+ status seeking transfer into [facility] for depression and SI treatment.     Problem/Plan - 1:  ·  Problem: Severe recurrent major depressive disorder with psychotic features.   ·  Plan: Progressively worsening depressive symptoms since 2/2022 with AH/VHs. No prior medical therapy, has had prior counseling in remote past given childhood traumas. Recently inpatient at [facility] 8/1-8/2 seeking treatment, discharged with Risperdal subsequently used for suicide attempt. Patient seeking self-admission to [facility] but Covid+ and pending bed at this time. Remote marijuana use (12/2021). Utox neg.  - plan to transfer/admit to [facility] when feasible (COVID+ 8/10, repeat swab 8/11 negative)  - Psych following, recommending inpatient admission given patient's risk to self  - 1:1 obvs while inpatient at [facility]  - Melatonin 3mg.     Problem/Plan - 2:  ·  Problem: Suicidal ideation.   ·  Plan: suicidal attempt with risperidone 8/7. No further side effects at this time  -constant observation  -Care d/w psych: OK to resume risperdal 1mg tonight   - PRN Ativan for agitation, PRN Benadryl for spasms.     Problem/Plan - 3:  ·  Problem: Asymptomatic COVID-19 virus infection.   ·  Plan: Prior contact at [facility] 8/1 with 3 Covid+ patients.  Asymptomatic at this time. Covid vax x1 3/2021.  - Not at high risk of progressing to severe COVID19 therefore not requiring Paxlovid or Remdesivir  - CTM for respiratory status/symptoms  - D-dimer elevated to 760  - lovenox 40mg BID  - Will repeat COVID swab Sunday to confirm negative.     Problem/Plan - 4:  ·  Problem: Prophylactic measure.   ·  Plan: DVT PPx: Lovenox 40mg BID  Diet: Regular  Code: Full code  Dispo: no PT needed    On ___ this case was reviewed with  ____, the patient is medically stable and optimized for discharge. All medications were reviewed and prescriptions were sent to mutually agreed upon pharmacy. 26M hx of MDD w/ psychotic features, PTSD, and recent suicide attempt admitted for asymptomatic COVID19+ status seeking transfer into [facility] for depression and SI treatment.    Severe recurrent major depressive disorder with psychotic features.   Progressively worsening depressive symptoms since 2/2022 with /s. No prior medical therapy, has had prior counseling in remote past given childhood traumas. Recently inpatient at [facility] 8/1-8/2 seeking treatment, discharged with Risperdal subsequently used for suicide attempt. Patient seeking self-admission to [facility] but initially Covid+ but now repeat x 3 are negative. Pending  non-COVID bed at this time. Remote marijuana use (12/2021). Utox neg.  - Psych following, recommending inpatient admission given patient's risk to self  - 1:1 obvs while inpatient at [facility]  - Melatonin 3mg.    Suicidal ideation.    suicidal attempt with risperidone 8/7. No further side effects at this time  -constant observation  -Care d/w psych: OK to resume risperdal 1mg   - PRN Ativan for agitation, PRN Benadryl for spasms.  -8/14 qtc 392      Asymptomatic COVID-19 virus infection.    Likely a false + admission though had prior contact at [facility] 8/1 with 3 Covid+ patients.  Asymptomatic at this time. Covid vax x1 3/2021.  tested negative for COVID x3     Discussed with attending transfer to [facility] for further care          26M hx of MDD w/ psychotic features, PTSD, and recent suicide attempt admitted for asymptomatic COVID19+ status seeking transfer into [facility] for depression and SI treatment.    Severe recurrent major depressive disorder with psychotic features.   Progressively worsening depressive symptoms since 2/2022 with /VHs. No prior medical therapy, has had prior counseling in remote past given childhood traumas. Recently inpatient at [facility] 8/1-8/2 seeking treatment, discharged with Risperdal subsequently used for suicide attempt. Patient seeking self-admission to [facility] but initially Covid+ but now repeat x 3 are negative. Pending  non-COVID bed at this time. Remote marijuana use (12/2021). Utox neg.  - Psych following, recommending inpatient admission given patient's risk to self  - 1:1 obvs while inpatient at [facility]  - Melatonin 3mg.    Suicidal ideation.    suicidal attempt with risperidone 8/7. No further side effects at this time  -constant observation  -Care d/w psych: OK to resume risperdal 1mg   - PRN Ativan for agitation, PRN Benadryl for spasms.  -8/14 qtc 392      Asymptomatic COVID-19 virus infection.    Likely a false + admission though had prior contact at [facility] 8/1 with 3 Covid+ patients.  Asymptomatic at this time. Covid vax x1 3/2021.  tested negative for COVID x3     Discussed with attending transfer to [facility] for further care   Signed out to [facility] hospitalist

## 2022-08-12 NOTE — DISCHARGE NOTE PROVIDER - NSDCMRMEDTOKEN_GEN_ALL_CORE_FT
RisperDAL:    diphenhydrAMINE 50 mg oral capsule: 1 cap(s) orally every 6 hours, As needed, extrapyramidal sx  LORazepam 1 mg oral tablet: 1 tab(s) orally every 4 hours, As needed, anxiety/ agitation  melatonin 3 mg oral tablet: 1 tab(s) orally once a day (at bedtime) 20:00  risperiDONE 1 mg oral tablet: 1 tab(s) orally once a day (at bedtime)

## 2022-08-12 NOTE — PROGRESS NOTE ADULT - SUBJECTIVE AND OBJECTIVE BOX
[facility] Division of Hospital Medicine  Roya Mayes DO  Available via MS Teams  In house pager [phone]     SUBJECTIVE / OVERNIGHT EVENTS: Pt feels less rigid today, no further spasms.  Took 1 Ativan PO overnight for anxiety, motivated to get to [facility] for treatment.  Repeat COVID swab negative, is understanding that bed is taking time due to COVID.     MEDICATIONS  (STANDING):  enoxaparin Injectable 40 milliGRAM(s) SubCutaneous every 12 hours  melatonin 3 milliGRAM(s) Oral at bedtime  risperiDONE   Tablet 1 milliGRAM(s) Oral at bedtime    MEDICATIONS  (PRN):  acetaminophen     Tablet .. 650 milliGRAM(s) Oral every 6 hours PRN Temp greater or equal to 38C (100.4F), Mild Pain (1 - 3)  diphenhydrAMINE 50 milliGRAM(s) Oral every 6 hours PRN extrapyramidal sx  diphenhydrAMINE Injectable 50 milliGRAM(s) IntraMuscular every 6 hours PRN extrapyramidal sx  LORazepam     Tablet 1 milliGRAM(s) Oral every 4 hours PRN anxiety/ agitation      I&O's Summary    11 Aug 2022 07:01  -  12 Aug 2022 07:00  --------------------------------------------------------  IN: 400 mL / OUT: 0 mL / NET: 400 mL        PHYSICAL EXAM:  Vital Signs Last 24 Hrs  T(C): 36.6 (11 Aug 2022 21:42), Max: 36.6 (11 Aug 2022 21:42)  T(F): 97.8 (11 Aug 2022 21:42), Max: 97.8 (11 Aug 2022 21:42)  HR: 85 (11 Aug 2022 21:42) (85 - 85)  BP: 107/75 (11 Aug 2022 21:42) (107/75 - 107/75)  BP(mean): --  RR: 16 (11 Aug 2022 21:42) (16 - 16)  SpO2: 100% (11 Aug 2022 21:42) (100% - 100%)    Parameters below as of 11 Aug 2022 21:42  Patient On (Oxygen Delivery Method): room air      CONSTITUTIONAL: NAD, well-developed, well-groomed  EYES: PERRLA; conjunctiva and sclera clear  RESPIRATORY: Normal respiratory effort; lungs are clear to auscultation bilaterally  CARDIOVASCULAR: Regular rate and rhythm, normal S1 and S2, no murmur/rub/gallop; No lower extremity edema  ABDOMEN: Nontender to palpation, normoactive bowel sounds, no rebound/guarding  MUSCULOSKELETAL:  No clubbing or cyanosis of digits; no joint swelling or tenderness to palpation  PSYCH: A+O to person, place, and time; flat affect  NEUROLOGY: CN 2-12 are intact and symmetric; no gross sensory deficits   SKIN: No rashes; no palpable lesions    LABS:                        17.7   7.86  )-----------( 223      ( 12 Aug 2022 05:25 )             54.9     08-12    136  |  98  |  10  ----------------------------<  90  4.2   |  25  |  1.00    Ca    10.2      12 Aug 2022 05:25  Phos  5.0     08-12  Mg     2.30     08-12    TPro  7.8  /  Alb  5.0  /  TBili  0.9  /  DBili  x   /  AST  18  /  ALT  24  /  AlkPhos  53  08-12    Urinalysis Basic - ( 10 Aug 2022 20:35 )    Color: Yellow / Appearance: Slightly Turbid / S.030 / pH: x  Gluc: x / Ketone: Moderate  / Bili: Negative / Urobili: 6 mg/dL   Blood: x / Protein: 30 mg/dL / Nitrite: Negative   Leuk Esterase: Negative / RBC: 4 /HPF / WBC 1 /HPF   Sq Epi: x / Non Sq Epi: 0 /HPF / Bacteria: Negative      COVID-19 PCR: NotDetec (11 Aug 2022 21:36)      COMMUNICATION:  Care Discussed with Consultants/Other Providers and Details of Discussion:  Dr. Romano

## 2022-08-12 NOTE — BH CONSULTATION LIAISON PROGRESS NOTE - NSBHFUPINTERVALHXFT_PSY_A_CORE
Patient seen and evaluated, remains on CO for SI. Chart reviewed, case discussed . No psych PRN, Vitals stable.  He expresses frustration over being in medical hospital, initially stating he does not think he needs to be here or go to [facility] and can follow up with outpatient therapy. Discuss extensively patient's chronicity and more acute problems. He is amenable to inpatient psychiatry, signed voluntary form 9.13. Also discussed medication options with him and he is agreeable to retrial of risperdal. He currently is denying symptoms of depression including issues with sleep, reports he ate well this AM. Denying thoughts of hurting himself or wanting to die. Denies feelings of anxiety. Denying manic symptoms. Denying psychotic symptoms including AVH.  Denying symptoms of EPS    Collateral obtained from patient's mother [phone]: Patient seen and evaluated, remains on CO for SI. Chart reviewed, case discussed . No psych PRN, Vitals stable.  He expresses frustration over being in medical hospital, initially stating he does not think he needs to be here or go to [facility] and can follow up with outpatient therapy. Discuss extensively patient's chronicity and more acute problems. He is amenable to inpatient psychiatry, signed voluntary form 9.13. Also discussed medication options with him and he is agreeable to retrial of risperdal. He currently is denying symptoms of depression including issues with sleep, reports he ate well this AM. Denying thoughts of hurting himself or wanting to die. Denies feelings of anxiety. Denying manic symptoms. Denying psychotic symptoms including AVH.  Denying symptoms of EPS    Collateral obtained from patient's mother [phone] by Radha Rasmussen MS3 with permission obtained verbally from patient.   Spoke with pts mother this am. Mother stated that pt. put himself into therapy March earlier this year. Pt followed for a few months, but stopped in early June because his therapist reportedly made him feel "uncomfortable". The pt's mother endorsed a buildup of psychosocial stressors that exacerbated to the pt's symptoms, specifically a breakup with his girlfriend at the end of July. Mother states that pt told his family he was going to visit [place], [place] with his friends, which was not unusual. However, a few hours later he called his family stating he had flown to [place] to meet with is ex girlfriend. Upon returning home, the pt visited [facility] on 8/1 at the recommendation of their family friend Dr. Granado who is a child Psychiatrist 2/2 endorsing symptoms consistent with depression and psychosis. He was diagnosed with MDD with psychotic features, and prescribed risperidone 2mg q.h.s. On Sunday, the whole family went to a birthday brunch. When they returned home, they subsequently went to see in-laws, with the pt staying behind because he was feeling "sad" about this breakup. At this point in time, he took 5 x 2mg risperidone and 9x1mg risperidone from his brother's prescription.    Pt's mother is in agreement with and advocated for inpatient treatment at [facility]. Mother states her chief concern is that the pt "has to stick" with his treatment. Pt's parents plan to support their son and facilitate his med adherence. Pt's mother states she has previous experience with pt's brother who was recently hospitalized at [facility] and diagnosed with bipolar disorder. Mother states that she can be reached at any time.

## 2022-08-13 LAB
ANION GAP SERPL CALC-SCNC: 15 MMOL/L — HIGH (ref 7–14)
BUN SERPL-MCNC: 18 MG/DL — SIGNIFICANT CHANGE UP (ref 7–23)
CALCIUM SERPL-MCNC: 9.6 MG/DL — SIGNIFICANT CHANGE UP (ref 8.4–10.5)
CHLORIDE SERPL-SCNC: 101 MMOL/L — SIGNIFICANT CHANGE UP (ref 98–107)
CO2 SERPL-SCNC: 23 MMOL/L — SIGNIFICANT CHANGE UP (ref 22–31)
CREAT SERPL-MCNC: 0.92 MG/DL — SIGNIFICANT CHANGE UP (ref 0.5–1.3)
EGFR: 119 ML/MIN/1.73M2 — SIGNIFICANT CHANGE UP
GLUCOSE SERPL-MCNC: 89 MG/DL — SIGNIFICANT CHANGE UP (ref 70–99)
HCT VFR BLD CALC: 48.4 % — SIGNIFICANT CHANGE UP (ref 39–50)
HGB BLD-MCNC: 16.3 G/DL — SIGNIFICANT CHANGE UP (ref 13–17)
MAGNESIUM SERPL-MCNC: 2.1 MG/DL — SIGNIFICANT CHANGE UP (ref 1.6–2.6)
MCHC RBC-ENTMCNC: 28.8 PG — SIGNIFICANT CHANGE UP (ref 27–34)
MCHC RBC-ENTMCNC: 33.7 GM/DL — SIGNIFICANT CHANGE UP (ref 32–36)
MCV RBC AUTO: 85.5 FL — SIGNIFICANT CHANGE UP (ref 80–100)
NRBC # BLD: 0 /100 WBCS — SIGNIFICANT CHANGE UP
NRBC # FLD: 0 K/UL — SIGNIFICANT CHANGE UP
PHOSPHATE SERPL-MCNC: 4.1 MG/DL — SIGNIFICANT CHANGE UP (ref 2.5–4.5)
PLATELET # BLD AUTO: 186 K/UL — SIGNIFICANT CHANGE UP (ref 150–400)
POTASSIUM SERPL-MCNC: 4.2 MMOL/L — SIGNIFICANT CHANGE UP (ref 3.5–5.3)
POTASSIUM SERPL-SCNC: 4.2 MMOL/L — SIGNIFICANT CHANGE UP (ref 3.5–5.3)
RBC # BLD: 5.66 M/UL — SIGNIFICANT CHANGE UP (ref 4.2–5.8)
RBC # FLD: 12.6 % — SIGNIFICANT CHANGE UP (ref 10.3–14.5)
SARS-COV-2 RNA SPEC QL NAA+PROBE: SIGNIFICANT CHANGE UP
SODIUM SERPL-SCNC: 139 MMOL/L — SIGNIFICANT CHANGE UP (ref 135–145)
WBC # BLD: 6.7 K/UL — SIGNIFICANT CHANGE UP (ref 3.8–10.5)
WBC # FLD AUTO: 6.7 K/UL — SIGNIFICANT CHANGE UP (ref 3.8–10.5)

## 2022-08-13 PROCEDURE — 99232 SBSQ HOSP IP/OBS MODERATE 35: CPT

## 2022-08-13 RX ORDER — ENOXAPARIN SODIUM 100 MG/ML
40 INJECTION SUBCUTANEOUS EVERY 24 HOURS
Refills: 0 | Status: DISCONTINUED | OUTPATIENT
Start: 2022-08-14 | End: 2022-08-15

## 2022-08-13 RX ADMIN — ENOXAPARIN SODIUM 40 MILLIGRAM(S): 100 INJECTION SUBCUTANEOUS at 05:30

## 2022-08-13 RX ADMIN — Medication 3 MILLIGRAM(S): at 21:16

## 2022-08-13 RX ADMIN — RISPERIDONE 1 MILLIGRAM(S): 4 TABLET ORAL at 21:17

## 2022-08-13 NOTE — PROGRESS NOTE ADULT - SUBJECTIVE AND OBJECTIVE BOX
[facility] Division of [facility]  Roya Mayes DO  Available via MS Teams  In house pager [phone]     SUBJECTIVE / OVERNIGHT EVENTS: Pt resting, very tired, no URI  symptoms, no fevers/chills.  Anxiously awaiting [facility] bed.     MEDICATIONS  (STANDING):  enoxaparin Injectable 40 milliGRAM(s) SubCutaneous every 12 hours  risperiDONE   Tablet 1 milliGRAM(s) Oral at bedtime    MEDICATIONS  (PRN):  acetaminophen     Tablet .. 650 milliGRAM(s) Oral every 6 hours PRN Temp greater or equal to 38C (100.4F), Mild Pain (1 - 3)  diphenhydrAMINE 50 milliGRAM(s) Oral every 6 hours PRN extrapyramidal sx  diphenhydrAMINE Injectable 50 milliGRAM(s) IntraMuscular every 6 hours PRN extrapyramidal sx  LORazepam     Tablet 1 milliGRAM(s) Oral every 4 hours PRN anxiety/ agitation  melatonin 3 milliGRAM(s) Oral <User Schedule> PRN Sleep      I&O's Summary      PHYSICAL EXAM:  Vital Signs Last 24 Hrs  T(C): 36.6 (13 Aug 2022 05:00), Max: 36.6 (12 Aug 2022 12:30)  T(F): 97.9 (13 Aug 2022 05:00), Max: 97.9 (13 Aug 2022 05:00)  HR: 62 (13 Aug 2022 05:00) (62 - 75)  BP: 121/70 (13 Aug 2022 05:00) (108/74 - 122/73)  BP(mean): --  RR: 16 (13 Aug 2022 05:00) (16 - 16)  SpO2: 99% (13 Aug 2022 05:00) (98% - 100%)    Parameters below as of 13 Aug 2022 05:00  Patient On (Oxygen Delivery Method): room air    PE Deferred today    LABS:                        16.3   6.70  )-----------( 186      ( 13 Aug 2022 07:00 )             48.4     08-13    139  |  101  |  18  ----------------------------<  89  4.2   |  23  |  0.92    Ca    9.6      13 Aug 2022 07:00  Phos  4.1     08-13  Mg     2.10     08-13    TPro  7.8  /  Alb  5.0  /  TBili  0.9  /  DBili  x   /  AST  18  /  ALT  24  /  AlkPhos  53  08-12              COVID-19 PCR: NotDetec (12 Aug 2022 21:13)  COVID-19 PCR: NotDete (11 Aug 2022 21:36)

## 2022-08-13 NOTE — CHART NOTE - NSCHARTNOTEFT_GEN_A_CORE
extensive discussion with patient, he would like assistance with his mental health issues but does not feel it is to his benefit to remain in the hospital waiting for an inpatient psych bed, he rather leave and find help elsewhere.  Discussed with psych - pt cannot sign himself out of the hospital, if needed will 2 PC on Monday.  Mr Willis updated and would like to receive treatment as soon as possible.  He is calm and cooperative at this time.  Dr Mayes aware extensive discussion with patient, he would like assistance with his mental health issues but does not feel it is to his benefit to remain in the hospital waiting for an inpatient psych bed, he rather leave and find help elsewhere.  Discussed with psych - pt cannot sign himself out of the hospital, risk to self, if needed will 2 PC on Monday.  Mr Willis updated and would like to receive treatment as soon as possible.  He is calm and cooperative at this time.  Dr Mayes aware

## 2022-08-13 NOTE — BH CONSULTATION LIAISON PROGRESS NOTE - NSBHFUPINTERVALHXFT_PSY_A_CORE
Patient seen and evaluated, remains on CO for SI. Chart reviewed, case discussed . No psych PRN, Vitals stable.    Patient continues to express frustration over being in medical hospital, wondering when he can get a bed and be transferred.   He currently is denying symptoms of depression including issues with sleep, reports he ate well this AM. Denying thoughts of hurting himself or wanting to die. Denies feelings of anxiety. Denying manic symptoms. Denying psychotic symptoms including AVH. Denying symptoms of EPS. Denies medication side effects. While he was initially amenable to inpatient psychiatry, signed voluntary form 9.13, he is beginning to express worsening frustration to treatment team as the day went on, demanding discharge home.
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
## 2022-08-14 LAB
ANION GAP SERPL CALC-SCNC: 13 MMOL/L — SIGNIFICANT CHANGE UP (ref 7–14)
BUN SERPL-MCNC: 14 MG/DL — SIGNIFICANT CHANGE UP (ref 7–23)
CALCIUM SERPL-MCNC: 9.8 MG/DL — SIGNIFICANT CHANGE UP (ref 8.4–10.5)
CHLORIDE SERPL-SCNC: 102 MMOL/L — SIGNIFICANT CHANGE UP (ref 98–107)
CO2 SERPL-SCNC: 24 MMOL/L — SIGNIFICANT CHANGE UP (ref 22–31)
CREAT SERPL-MCNC: 0.83 MG/DL — SIGNIFICANT CHANGE UP (ref 0.5–1.3)
EGFR: 125 ML/MIN/1.73M2 — SIGNIFICANT CHANGE UP
GLUCOSE SERPL-MCNC: 87 MG/DL — SIGNIFICANT CHANGE UP (ref 70–99)
HCT VFR BLD CALC: 51.5 % — HIGH (ref 39–50)
HGB BLD-MCNC: 16.5 G/DL — SIGNIFICANT CHANGE UP (ref 13–17)
MAGNESIUM SERPL-MCNC: 2 MG/DL — SIGNIFICANT CHANGE UP (ref 1.6–2.6)
MCHC RBC-ENTMCNC: 27.9 PG — SIGNIFICANT CHANGE UP (ref 27–34)
MCHC RBC-ENTMCNC: 32 GM/DL — SIGNIFICANT CHANGE UP (ref 32–36)
MCV RBC AUTO: 87.1 FL — SIGNIFICANT CHANGE UP (ref 80–100)
NRBC # BLD: 0 /100 WBCS — SIGNIFICANT CHANGE UP
NRBC # FLD: 0 K/UL — SIGNIFICANT CHANGE UP
PHOSPHATE SERPL-MCNC: 4 MG/DL — SIGNIFICANT CHANGE UP (ref 2.5–4.5)
PLATELET # BLD AUTO: 202 K/UL — SIGNIFICANT CHANGE UP (ref 150–400)
POTASSIUM SERPL-MCNC: 4 MMOL/L — SIGNIFICANT CHANGE UP (ref 3.5–5.3)
POTASSIUM SERPL-SCNC: 4 MMOL/L — SIGNIFICANT CHANGE UP (ref 3.5–5.3)
RBC # BLD: 5.91 M/UL — HIGH (ref 4.2–5.8)
RBC # FLD: 12.4 % — SIGNIFICANT CHANGE UP (ref 10.3–14.5)
SARS-COV-2 RNA SPEC QL NAA+PROBE: SIGNIFICANT CHANGE UP
SODIUM SERPL-SCNC: 139 MMOL/L — SIGNIFICANT CHANGE UP (ref 135–145)
WBC # BLD: 5.79 K/UL — SIGNIFICANT CHANGE UP (ref 3.8–10.5)
WBC # FLD AUTO: 5.79 K/UL — SIGNIFICANT CHANGE UP (ref 3.8–10.5)

## 2022-08-14 PROCEDURE — 99232 SBSQ HOSP IP/OBS MODERATE 35: CPT

## 2022-08-14 RX ADMIN — Medication 3 MILLIGRAM(S): at 21:18

## 2022-08-14 RX ADMIN — Medication 650 MILLIGRAM(S): at 16:57

## 2022-08-14 RX ADMIN — ENOXAPARIN SODIUM 40 MILLIGRAM(S): 100 INJECTION SUBCUTANEOUS at 06:48

## 2022-08-14 RX ADMIN — RISPERIDONE 1 MILLIGRAM(S): 4 TABLET ORAL at 21:24

## 2022-08-14 RX ADMIN — Medication 650 MILLIGRAM(S): at 17:30

## 2022-08-14 NOTE — PROGRESS NOTE ADULT - SUBJECTIVE AND OBJECTIVE BOX
[facility] Division of Hospital Medicine  Roya MayesDO  Available via MS Teams  In house pager [phone]     SUBJECTIVE / OVERNIGHT EVENTS: Pt understandably frustrated that he's in the hospital and not at [facility] where he can received the treatment he needs.  REmains afebrile, nontoxic, sitting up in chair.  Also getting frustrated because he's getting mixed messages, was able to have a pencil and paper few days ago but was taken away from the night team.        MEDICATIONS  (STANDING):  enoxaparin Injectable 40 milliGRAM(s) SubCutaneous every 24 hours  risperiDONE   Tablet 1 milliGRAM(s) Oral at bedtime    MEDICATIONS  (PRN):  acetaminophen     Tablet .. 650 milliGRAM(s) Oral every 6 hours PRN Temp greater or equal to 38C (100.4F), Mild Pain (1 - 3)  diphenhydrAMINE 50 milliGRAM(s) Oral every 6 hours PRN extrapyramidal sx  diphenhydrAMINE Injectable 50 milliGRAM(s) IntraMuscular every 6 hours PRN extrapyramidal sx  LORazepam     Tablet 1 milliGRAM(s) Oral every 4 hours PRN anxiety/ agitation  melatonin 3 milliGRAM(s) Oral <User Schedule> PRN Sleep      I&O's Summary      PHYSICAL EXAM:  Vital Signs Last 24 Hrs  T(C): 36.2 (14 Aug 2022 06:29), Max: 36.8 (13 Aug 2022 16:33)  T(F): 97.1 (14 Aug 2022 06:29), Max: 98.2 (13 Aug 2022 16:33)  HR: 57 (14 Aug 2022 06:29) (57 - 73)  BP: 112/64 (14 Aug 2022 06:29) (109/66 - 112/64)  BP(mean): --  RR: 17 (14 Aug 2022 06:29) (16 - 17)  SpO2: 99% (14 Aug 2022 06:29) (98% - 100%)    Parameters below as of 14 Aug 2022 06:29  Patient On (Oxygen Delivery Method): room air      PE: deferred, unchanged     LABS:                        16.5   5.79  )-----------( 202      ( 14 Aug 2022 06:35 )             51.5     08-14    139  |  102  |  14  ----------------------------<  87  4.0   |  24  |  0.83    Ca    9.8      14 Aug 2022 06:35  Phos  4.0     08-14  Mg     2.00     08-14                COVID-19 PCR: NotDetec (12 Aug 2022 21:13)  COVID-19 PCR: NotDetec (11 Aug 2022 21:36)

## 2022-08-14 NOTE — PROVIDER CONTACT NOTE (OTHER) - ASSESSMENT
pt resting in bed; on a 1:1; asymptomatic; pt insisted when he was in 531 he had all of these taken away and when transferred to 510 all were given back to him; as per patient psych doctor said it is okay and day ANM; however mentioned to ANM [place] and according to policy we have to remove everything
Dr Mayes says patient is allowed to have pencils and paper as long as PCA is in room.

## 2022-08-14 NOTE — PROVIDER CONTACT NOTE (OTHER) - SITUATION
Patient on 1:1 for SI, allowed pencils and paper
at change of shift noticed the remote control, call bell, medication tray in drawer, and pencil were within reach of patient

## 2022-08-14 NOTE — BH CONSULTATION LIAISON PROGRESS NOTE - NSBHFUPINTERVALHXFT_PSY_A_CORE
Patient seen and evaluated, remains on CO for SI. Chart reviewed, case discussed. No psych PRNs required.    Patient continues to express frustration over being in medical hospital, wondering when he can get a bed and be transferred. Expresses frustration over what he says have been inconsistencies with what objects is allowed and not allowed to have. Extensive psychoeducation provided. Reports good mood, denies SIIP/HIIP.
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
## 2022-08-15 ENCOUNTER — INPATIENT (INPATIENT)
Facility: HOSPITAL | Age: 27
LOS: 2 days | Discharge: HOME | End: 2022-08-18
Attending: PSYCHIATRY & NEUROLOGY | Admitting: PSYCHIATRY & NEUROLOGY
Payer: COMMERCIAL

## 2022-08-15 VITALS — WEIGHT: 179.9 LBS | HEIGHT: 68 IN | TEMPERATURE: 98 F

## 2022-08-15 VITALS
DIASTOLIC BLOOD PRESSURE: 73 MMHG | RESPIRATION RATE: 17 BRPM | HEART RATE: 84 BPM | SYSTOLIC BLOOD PRESSURE: 127 MMHG | TEMPERATURE: 98 F | OXYGEN SATURATION: 99 %

## 2022-08-15 DIAGNOSIS — F33.9 MAJOR DEPRESSIVE DISORDER, RECURRENT, UNSPECIFIED: ICD-10-CM

## 2022-08-15 PROBLEM — F32.A DEPRESSION, UNSPECIFIED: Chronic | Status: ACTIVE | Noted: 2022-08-10

## 2022-08-15 PROBLEM — F43.10 POST-TRAUMATIC STRESS DISORDER, UNSPECIFIED: Chronic | Status: ACTIVE | Noted: 2022-08-10

## 2022-08-15 PROCEDURE — 99233 SBSQ HOSP IP/OBS HIGH 50: CPT

## 2022-08-15 PROCEDURE — 99232 SBSQ HOSP IP/OBS MODERATE 35: CPT

## 2022-08-15 PROCEDURE — 99221 1ST HOSP IP/OBS SF/LOW 40: CPT | Mod: GC

## 2022-08-15 RX ORDER — RISPERIDONE 4 MG/1
1 TABLET ORAL
Qty: 0 | Refills: 0 | DISCHARGE
Start: 2022-08-15

## 2022-08-15 RX ORDER — DIPHENHYDRAMINE HCL 50 MG
1 CAPSULE ORAL
Qty: 0 | Refills: 0 | DISCHARGE
Start: 2022-08-15

## 2022-08-15 RX ORDER — DIPHENHYDRAMINE HCL 50 MG
50 CAPSULE ORAL ONCE
Refills: 0 | Status: DISCONTINUED | OUTPATIENT
Start: 2022-08-15 | End: 2022-08-18

## 2022-08-15 RX ORDER — RISPERIDONE 4 MG/1
0 TABLET ORAL
Qty: 0 | Refills: 0 | DISCHARGE

## 2022-08-15 RX ORDER — HALOPERIDOL DECANOATE 100 MG/ML
5 INJECTION INTRAMUSCULAR ONCE
Refills: 0 | Status: DISCONTINUED | OUTPATIENT
Start: 2022-08-15 | End: 2022-08-18

## 2022-08-15 RX ORDER — LANOLIN ALCOHOL/MO/W.PET/CERES
3 CREAM (GRAM) TOPICAL AT BEDTIME
Refills: 0 | Status: DISCONTINUED | OUTPATIENT
Start: 2022-08-15 | End: 2022-08-18

## 2022-08-15 RX ORDER — LANOLIN ALCOHOL/MO/W.PET/CERES
1 CREAM (GRAM) TOPICAL
Qty: 0 | Refills: 0 | DISCHARGE
Start: 2022-08-15

## 2022-08-15 RX ORDER — RISPERIDONE 4 MG/1
1 TABLET ORAL AT BEDTIME
Refills: 0 | Status: DISCONTINUED | OUTPATIENT
Start: 2022-08-15 | End: 2022-08-16

## 2022-08-15 RX ORDER — DIPHENHYDRAMINE HCL 50 MG
50 CAPSULE ORAL EVERY 6 HOURS
Refills: 0 | Status: DISCONTINUED | OUTPATIENT
Start: 2022-08-15 | End: 2022-08-18

## 2022-08-15 RX ORDER — HALOPERIDOL DECANOATE 100 MG/ML
5 INJECTION INTRAMUSCULAR EVERY 4 HOURS
Refills: 0 | Status: DISCONTINUED | OUTPATIENT
Start: 2022-08-15 | End: 2022-08-18

## 2022-08-15 RX ADMIN — RISPERIDONE 1 MILLIGRAM(S): 4 TABLET ORAL at 20:29
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
## 2022-08-15 NOTE — PROGRESS NOTE ADULT - PROBLEM SELECTOR PROBLEM 1
Severe recurrent major depressive disorder with psychotic features

## 2022-08-15 NOTE — BH CONSULTATION LIAISON PROGRESS NOTE - NSBHCHARTREVIEWLAB_PSY_A_CORE FT
16.5   5.79  )-----------( 202      ( 14 Aug 2022 06:35 )             51.5   08-14    139  |  102  |  14  ----------------------------<  87  4.0   |  24  |  0.83    Ca    9.8      14 Aug 2022 06:35  Phos  4.0     08-14  Mg     2.00     08-14    
                      17.7   7.86  )-----------( 223      ( 12 Aug 2022 05:25 )             54.9   08-12    136  |  98  |  10  ----------------------------<  90  4.2   |  25  |  1.00    Ca    10.2      12 Aug 2022 05:25  Phos  5.0     08-12  Mg     2.30     08-12    TPro  7.8  /  Alb  5.0  /  TBili  0.9  /  DBili  x   /  AST  18  /  ALT  24  /  AlkPhos  53  08-12  
denies street drug use/caffeine
                      16.9   7.20  )-----------( 219      ( 10 Aug 2022 20:35 )             50.6   08-10    141  |  103  |  8   ----------------------------<  90  4.0   |  24  |  1.01    Ca    10.2      10 Aug 2022 20:35    TPro  6.9  /  Alb  5.0  /  TBili  0.8  /  DBili  x   /  AST  21  /  ALT  24  /  AlkPhos  53  08-10  
                      17.7   7.86  )-----------( 223      ( 12 Aug 2022 05:25 )             54.9   08-12    136  |  98  |  10  ----------------------------<  90  4.2   |  25  |  1.00    Ca    10.2      12 Aug 2022 05:25  Phos  5.0     08-12  Mg     2.30     08-12    TPro  7.8  /  Alb  5.0  /  TBili  0.9  /  DBili  x   /  AST  18  /  ALT  24  /  AlkPhos  53  08-12  
                      17.7   7.86  )-----------( 223      ( 12 Aug 2022 05:25 )             54.9   08-12    136  |  98  |  10  ----------------------------<  90  4.2   |  25  |  1.00    Ca    10.2      12 Aug 2022 05:25  Phos  5.0     08-12  Mg     2.30     08-12    TPro  7.8  /  Alb  5.0  /  TBili  0.9  /  DBili  x   /  AST  18  /  ALT  24  /  AlkPhos  53  08-12

## 2022-08-15 NOTE — BH INPATIENT PSYCHIATRY ASSESSMENT NOTE - NSBHCHARTREVIEWINVESTIGATE_PSY_A_CORE FT
< from: 12 Lead ECG (08.10.22 @ 21:35) >      Ventricular Rate 75 BPM    Atrial Rate 75 BPM    P-R Interval 160 ms    QRS Duration 86 ms    Q-T Interval 344 ms    QTC Calculation(Bazett) 384 ms    P Axis 64 degrees    R Axis 57 degrees    T Axis 47 degrees    Diagnosis Line Normal sinus rhythm  Normal ECG      < from: Xray Chest 1 View AP/PA (08.11.22 @ 16:00) >    ACC: [id number] EXAM:  XR CHEST AP OR PA 1V                          PROCEDURE DATE:  08/11/2022          INTERPRETATION:  CLINICAL INFORMATION: Covid positive with cough    Time of Exam:  August 11, 2022 at 3:15 PM    EXAM:  Frontal Chest    FINDINGS:  Both lungs are equally aerated and free of any focal abnormalities. The   heart is not enlarged and there is no effusion.        COMPARISON:  None Available        IMPRESSION: Clear lungs.

## 2022-08-15 NOTE — BH CONSULTATION LIAISON PROGRESS NOTE - NSBHATTESTTYPEVISIT_PSY_A_CORE
Resident/Fellow with telephonic supervision
Attending with Resident/Fellow/Student
Resident/Fellow with telephonic supervision
Attending with Resident/Fellow/Student
Attending with Resident/Fellow/Student
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
## 2022-08-15 NOTE — PROGRESS NOTE ADULT - SUBJECTIVE AND OBJECTIVE BOX
[facility] Division of Hospital Medicine  Link Lopez DO  Pager (M-F, 8A-5P): [phone]      Patient is a 26y old  Male who presents with a chief complaint of MDD w/ psychosis Covid+ status (14 Aug 2022 11:09)      SUBJECTIVE / OVERNIGHT EVENTS: no overnight events   ADDITIONAL REVIEW OF SYSTEMS:    MEDICATIONS  (STANDING):  enoxaparin Injectable 40 milliGRAM(s) SubCutaneous every 24 hours  risperiDONE   Tablet 1 milliGRAM(s) Oral at bedtime    MEDICATIONS  (PRN):  acetaminophen     Tablet .. 650 milliGRAM(s) Oral every 6 hours PRN Temp greater or equal to 38C (100.4F), Mild Pain (1 - 3)  diphenhydrAMINE 50 milliGRAM(s) Oral every 6 hours PRN extrapyramidal sx  diphenhydrAMINE Injectable 50 milliGRAM(s) IntraMuscular every 6 hours PRN extrapyramidal sx  LORazepam     Tablet 1 milliGRAM(s) Oral every 4 hours PRN anxiety/ agitation  melatonin 3 milliGRAM(s) Oral <User Schedule> PRN Sleep      CAPILLARY BLOOD GLUCOSE        I&O's Summary      PHYSICAL EXAM:  Vital Signs Last 24 Hrs  T(C): 36.3 (15 Aug 2022 05:53), Max: 36.9 (14 Aug 2022 12:03)  T(F): 97.4 (15 Aug 2022 05:53), Max: 98.4 (14 Aug 2022 12:03)  HR: 83 (14 Aug 2022 12:03) (83 - 83)  BP: 109/69 (15 Aug 2022 05:53) (109/69 - 123/70)  BP(mean): --  RR: 18 (15 Aug 2022 05:53) (18 - 18)  SpO2: 98% (15 Aug 2022 05:53) (98% - 100%)    Parameters below as of 15 Aug 2022 05:53  Patient On (Oxygen Delivery Method): room air      CONSTITUTIONAL: NAD, well-developed, well-groomed  EYES: EOMI; conjunctiva and sclera clear  ENMT: Moist oral mucosa, no pharyngeal injection or exudates; normal dentition  NECK: Supple, no palpable masses; no thyromegaly  RESPIRATORY: Normal respiratory effort; lungs are clear to auscultation bilaterally  CARDIOVASCULAR: Regular rate and rhythm, normal S1 and S2, no murmur/rub/gallop; No lower extremity edema; Peripheral pulses are 2+ bilaterally  ABDOMEN: Nontender to palpation, normoactive bowel sounds, no rebound/guarding; No hepatosplenomegaly  MUSKULOSKELETAL:  no clubbing or cyanosis of digits; no joint swelling or tenderness to palpation  PSYCH: A+O to person, place, and time; affect appropriate  NEUROLOGY: CN 2-12 are intact and symmetric; no gross sensory deficits;   SKIN: No rashes; no palpable lesions    LABS:                        16.5   5.79  )-----------( 202      ( 14 Aug 2022 06:35 )             51.5     08-14    139  |  102  |  14  ----------------------------<  87  4.0   |  24  |  0.83    Ca    9.8      14 Aug 2022 06:35  Phos  4.0     08-14  Mg     2.00     08-14                  RADIOLOGY & ADDITIONAL TESTS:  Results Reviewed:   Imaging Personally Reviewed:  Electrocardiogram Personally Reviewed:    COORDINATION OF CARE:  Care Discussed with Consultants/Other Providers [Y/N]: Y  Prior or Outpatient Records Reviewed [Y/N]: Y   [facility] Division of Hospital Medicine  Link LopezDO  Pager (M-F, 8A-5P): [phone]      Patient is a 26y old  Male who presents with a chief complaint of MDD w/ psychosis Covid+ status (14 Aug 2022 11:09)      SUBJECTIVE / OVERNIGHT EVENTS: no overnight events, pt sitting in chair on doorway, eager to go to [facility]   ADDITIONAL REVIEW OF SYSTEMS: no cp/sob     MEDICATIONS  (STANDING):  enoxaparin Injectable 40 milliGRAM(s) SubCutaneous every 24 hours  risperiDONE   Tablet 1 milliGRAM(s) Oral at bedtime    MEDICATIONS  (PRN):  acetaminophen     Tablet .. 650 milliGRAM(s) Oral every 6 hours PRN Temp greater or equal to 38C (100.4F), Mild Pain (1 - 3)  diphenhydrAMINE 50 milliGRAM(s) Oral every 6 hours PRN extrapyramidal sx  diphenhydrAMINE Injectable 50 milliGRAM(s) IntraMuscular every 6 hours PRN extrapyramidal sx  LORazepam     Tablet 1 milliGRAM(s) Oral every 4 hours PRN anxiety/ agitation  melatonin 3 milliGRAM(s) Oral <User Schedule> PRN Sleep      CAPILLARY BLOOD GLUCOSE        I&O's Summary      PHYSICAL EXAM:  Vital Signs Last 24 Hrs  T(C): 36.3 (15 Aug 2022 05:53), Max: 36.9 (14 Aug 2022 12:03)  T(F): 97.4 (15 Aug 2022 05:53), Max: 98.4 (14 Aug 2022 12:03)  HR: 83 (14 Aug 2022 12:03) (83 - 83)  BP: 109/69 (15 Aug 2022 05:53) (109/69 - 123/70)  BP(mean): --  RR: 18 (15 Aug 2022 05:53) (18 - 18)  SpO2: 98% (15 Aug 2022 05:53) (98% - 100%)    Parameters below as of 15 Aug 2022 05:53  Patient On (Oxygen Delivery Method): room air      CONSTITUTIONAL: NAD, well appearing   HEENT: EOMI, MMM  RESPIRATORY: Normal respiratory effort; lungs are clear to auscultation bilaterally  CARDIOVASCULAR: Regular rate and rhythm, normal S1 and S2, no murmurs  ABDOMEN: Nontender to palpation, normoactive bowel sounds  MUSKULOSKELETAL:  no clubbing or cyanosis of digits; no joint swelling or tenderness to palpation  PSYCH: calm, cooperative   NEUROLOGY: CN 2-12 are intact and symmetric; no gross sensory deficits;   SKIN: No rashes; no palpable lesions    LABS:                        16.5   5.79  )-----------( 202      ( 14 Aug 2022 06:35 )             51.5     08-14    139  |  102  |  14  ----------------------------<  87  4.0   |  24  |  0.83    Ca    9.8      14 Aug 2022 06:35  Phos  4.0     08-14  Mg     2.00     08-14                  RADIOLOGY & ADDITIONAL TESTS:  Results Reviewed:   Imaging Personally Reviewed:  Electrocardiogram Personally Reviewed:    COORDINATION OF CARE:  Care Discussed with Consultants/Other Providers [Y/N]: Y  Prior or Outpatient Records Reviewed [Y/N]: Y

## 2022-08-15 NOTE — BH CONSULTATION LIAISON PROGRESS NOTE - NSBHFUPINTERVALCCFT_PSY_A_CORE
"Is there a bed yet?"
"I don't think I need hospitalization or medications"
"I really think I only need therapy"  Chart reviewed, case discussed  VSS  Took one prn of ativan overnight to help with sleep
"When can I get a bed"
"I didn't want to die but I was tired of how I was feeling"

## 2022-08-15 NOTE — BH PATIENT PROFILE - HOME MEDICATIONS
melatonin 3 mg oral tablet , 1 tab(s) orally once a day (at bedtime) 20:00  diphenhydrAMINE 50 mg oral capsule , 1 cap(s) orally every 6 hours, As needed, extrapyramidal sx  LORazepam 1 mg oral tablet , 1 tab(s) orally every 4 hours, As needed, anxiety/ agitation  risperiDONE 1 mg oral tablet , 1 tab(s) orally once a day (at bedtime)

## 2022-08-15 NOTE — BH INPATIENT PSYCHIATRY ASSESSMENT NOTE - CURRENT MEDICATION
MEDICATIONS  (STANDING):  risperiDONE   Tablet 1 milliGRAM(s) Oral at bedtime    MEDICATIONS  (PRN):  diphenhydrAMINE 50 milliGRAM(s) Oral every 6 hours PRN EPS  diphenhydrAMINE Injectable 50 milliGRAM(s) IntraMuscular once PRN EPS  haloperidol     Tablet 5 milliGRAM(s) Oral every 4 hours PRN agitation  haloperidol    Injectable 5 milliGRAM(s) IntraMuscular once PRN combative behavior  LORazepam     Tablet 1 milliGRAM(s) Oral every 4 hours PRN anxiety/ agitation  LORazepam   Injectable 2 milliGRAM(s) IntraMuscular once PRN combative behavior  melatonin. 3 milliGRAM(s) Oral at bedtime PRN Insomnia

## 2022-08-15 NOTE — BH CONSULTATION LIAISON PROGRESS NOTE - NSBHMSEMUSCLE_PSY_A_CORE
Unable to assess
Normal muscle tone/strength
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
## 2022-08-15 NOTE — BH PATIENT PROFILE - NSSUICIDALITYYEAR_PSY_ALL_CORE
within functional limits Pt had SI with recent OD but denies current S/I/I/P and SIB and reports regretting recent SA

## 2022-08-15 NOTE — BH INPATIENT PSYCHIATRY ASSESSMENT NOTE - NSBHCHARTREVIEWLAB_PSY_A_CORE FT
16.5   5.79  )-----------( 202      ( 14 Aug 2022 06:35 )             51.5       08-14    139  |  102  |  14  ----------------------------<  87  4.0   |  24  |  0.83    Ca    9.8      14 Aug 2022 06:35  Phos  4.0     08-14  Mg     2.00     08-14

## 2022-08-15 NOTE — BH CONSULTATION LIAISON PROGRESS NOTE - CURRENT MEDICATION
MEDICATIONS  (STANDING):  enoxaparin Injectable 40 milliGRAM(s) SubCutaneous every 12 hours  melatonin 3 milliGRAM(s) Oral at bedtime    MEDICATIONS  (PRN):  acetaminophen     Tablet .. 650 milliGRAM(s) Oral every 6 hours PRN Temp greater or equal to 38C (100.4F), Mild Pain (1 - 3)  
MEDICATIONS  (STANDING):  enoxaparin Injectable 40 milliGRAM(s) SubCutaneous every 12 hours  melatonin 3 milliGRAM(s) Oral at bedtime    MEDICATIONS  (PRN):  acetaminophen     Tablet .. 650 milliGRAM(s) Oral every 6 hours PRN Temp greater or equal to 38C (100.4F), Mild Pain (1 - 3)  diphenhydrAMINE 50 milliGRAM(s) Oral every 6 hours PRN extrapyramidal sx  diphenhydrAMINE Injectable 50 milliGRAM(s) IntraMuscular every 6 hours PRN extrapyramidal sx  LORazepam     Tablet 1 milliGRAM(s) Oral every 4 hours PRN anxiety/ agitation  
MEDICATIONS  (STANDING):  enoxaparin Injectable 40 milliGRAM(s) SubCutaneous every 24 hours  risperiDONE   Tablet 1 milliGRAM(s) Oral at bedtime    MEDICATIONS  (PRN):  acetaminophen     Tablet .. 650 milliGRAM(s) Oral every 6 hours PRN Temp greater or equal to 38C (100.4F), Mild Pain (1 - 3)  diphenhydrAMINE 50 milliGRAM(s) Oral every 6 hours PRN extrapyramidal sx  diphenhydrAMINE Injectable 50 milliGRAM(s) IntraMuscular every 6 hours PRN extrapyramidal sx  LORazepam     Tablet 1 milliGRAM(s) Oral every 4 hours PRN anxiety/ agitation  melatonin 3 milliGRAM(s) Oral <User Schedule> PRN Sleep  
MEDICATIONS  (STANDING):  enoxaparin Injectable 40 milliGRAM(s) SubCutaneous every 24 hours  risperiDONE   Tablet 1 milliGRAM(s) Oral at bedtime    MEDICATIONS  (PRN):  acetaminophen     Tablet .. 650 milliGRAM(s) Oral every 6 hours PRN Temp greater or equal to 38C (100.4F), Mild Pain (1 - 3)  diphenhydrAMINE 50 milliGRAM(s) Oral every 6 hours PRN extrapyramidal sx  diphenhydrAMINE Injectable 50 milliGRAM(s) IntraMuscular every 6 hours PRN extrapyramidal sx  LORazepam     Tablet 1 milliGRAM(s) Oral every 4 hours PRN anxiety/ agitation  melatonin 3 milliGRAM(s) Oral <User Schedule> PRN Sleep  
MEDICATIONS  (STANDING):  risperiDONE   Tablet 1 milliGRAM(s) Oral at bedtime    MEDICATIONS  (PRN):  acetaminophen     Tablet .. 650 milliGRAM(s) Oral every 6 hours PRN Temp greater or equal to 38C (100.4F), Mild Pain (1 - 3)  diphenhydrAMINE 50 milliGRAM(s) Oral every 6 hours PRN extrapyramidal sx  diphenhydrAMINE Injectable 50 milliGRAM(s) IntraMuscular every 6 hours PRN extrapyramidal sx  LORazepam     Tablet 1 milliGRAM(s) Oral every 4 hours PRN anxiety/ agitation  melatonin 3 milliGRAM(s) Oral <User Schedule> PRN Sleep

## 2022-08-15 NOTE — PROGRESS NOTE ADULT - PROBLEM SELECTOR PLAN 2
suicidal attempt with risperidone 8/7. No further side effects at this time  -constant observation  -Care d/w psych: hold further risperidone for now given complaints of persistent spasms and rigidity (though not seen on exam)  - PRN Ativan for agitaiton, PRN Benadryl for spasms
suicidal attempt with risperidone 8/7. No further side effects at this time  -constant observation  -Seen by psych: OK to resume risperdal 1mg   - PRN Ativan for agitation, PRN Benadryl for spasms
suicidal attempt with risperidone 8/7. No further side effects at this time  -constant observation  -Care d/w psych: OK to resume risperdal 1mg   - PRN Ativan for agitation, PRN Benadryl for spasms
suicidal attempt with risperidone 8/7. No further side effects at this time  -constant observation  -Care d/w psych: OK to resume risperdal 1mg tonight   - PRN Ativan for agitation, PRN Benadryl for spasms
suicidal attempt with risperidone 8/7. No further side effects at this time  -constant observation  -Care d/w psych: OK to resume risperdal 1mg tonight   - PRN Ativan for agitation, PRN Benadryl for spasms

## 2022-08-15 NOTE — BH CONSULTATION LIAISON PROGRESS NOTE - GENERAL APPEARANCE
No deformities present/Other

## 2022-08-15 NOTE — DISCHARGE NOTE NURSING/CASE MANAGEMENT/SOCIAL WORK - PATIENT PORTAL LINK FT
You can access the FollowMyHealth Patient Portal offered by [facility] by registering at the following website: [email or web address]. By joining [facility]’s FollowMyHealth portal, you will also be able to view your health information using other applications (apps) compatible with our system.

## 2022-08-15 NOTE — BH INPATIENT PSYCHIATRY ASSESSMENT NOTE - OTHER PAST PSYCHIATRIC HISTORY (INCLUDE DETAILS REGARDING ONSET, COURSE OF ILLNESS, INPATIENT/OUTPATIENT TREATMENT)
denies prior inpatient psych admissions, no rehab    Started on Risperdal on 8/1. No prior med trials

## 2022-08-15 NOTE — BH CONSULTATION LIAISON PROGRESS NOTE - NSBHFUPINTERVALHXFT_PSY_A_CORE
Patient seen and evaluated, remains on CO for SI. Chart reviewed, case discussed. No psych PRNs required, taking melatonin.    Patient reports continued frustration over not being transferred to inpatient psychiatry and is looking to continue his treatment as an outpatient. Discuss patient's reason for admission as well as his history of outpatient treatment being unsuccessful in recent months. He is currently minimizing reasons for admission and demonstrating limited insight into reasons for being here.  Is currently denying Si/hi/avh/manic symptoms.

## 2022-08-15 NOTE — BH INPATIENT PSYCHIATRY ASSESSMENT NOTE - NSBHMETABOLIC_PSY_ALL_CORE_FT
BMI: BMI (kg/m2): 27.4 (08-15-22 @ 16:49)  HbA1c:   Glucose: 87 mg/dL  BP: 121/73  Lipid Panel:  BMI: BMI (kg/m2): 27.4 (08-15-22 @ 16:49)  HbA1c:   Glucose:   BP: --  Lipid Panel:

## 2022-08-15 NOTE — BH CONSULTATION LIAISON PROGRESS NOTE - NSBHCHARTREVIEWVS_PSY_A_CORE FT
Vital Signs Last 24 Hrs  T(C): 36.2 (11 Aug 2022 13:43), Max: 36.7 (11 Aug 2022 01:47)  T(F): 97.2 (11 Aug 2022 13:43), Max: 98 (11 Aug 2022 01:47)  HR: 89 (11 Aug 2022 13:43) (89 - 104)  BP: 113/65 (11 Aug 2022 13:43) (109/78 - 147/90)  BP(mean): --  RR: 17 (11 Aug 2022 13:43) (16 - 18)  SpO2: 100% (11 Aug 2022 13:43) (99% - 100%)    Parameters below as of 11 Aug 2022 13:43  Patient On (Oxygen Delivery Method): room air    
Vital Signs Last 24 Hrs  T(C): 36.6 (15 Aug 2022 13:01), Max: 36.6 (15 Aug 2022 13:01)  T(F): 97.9 (15 Aug 2022 13:01), Max: 97.9 (15 Aug 2022 13:01)  HR: 84 (15 Aug 2022 13:01) (84 - 84)  BP: 127/73 (15 Aug 2022 13:01) (109/69 - 127/73)  BP(mean): --  RR: 17 (15 Aug 2022 13:01) (17 - 18)  SpO2: 99% (15 Aug 2022 13:01) (98% - 99%)    Parameters below as of 15 Aug 2022 13:01  Patient On (Oxygen Delivery Method): room air    
Vital Signs Last 24 Hrs  T(C): 36.2 (14 Aug 2022 06:29), Max: 36.8 (13 Aug 2022 16:33)  T(F): 97.1 (14 Aug 2022 06:29), Max: 98.2 (13 Aug 2022 16:33)  HR: 57 (14 Aug 2022 06:29) (57 - 73)  BP: 112/64 (14 Aug 2022 06:29) (109/66 - 112/64)  BP(mean): --  RR: 17 (14 Aug 2022 06:29) (16 - 17)  SpO2: 99% (14 Aug 2022 06:29) (98% - 100%)    Parameters below as of 14 Aug 2022 06:29  Patient On (Oxygen Delivery Method): room air    
Vital Signs Last 24 Hrs  T(C): 36.6 (11 Aug 2022 21:42), Max: 36.6 (11 Aug 2022 21:42)  T(F): 97.8 (11 Aug 2022 21:42), Max: 97.8 (11 Aug 2022 21:42)  HR: 85 (11 Aug 2022 21:42) (85 - 89)  BP: 107/75 (11 Aug 2022 21:42) (107/75 - 113/65)  BP(mean): --  RR: 16 (11 Aug 2022 21:42) (16 - 17)  SpO2: 100% (11 Aug 2022 21:42) (100% - 100%)    Parameters below as of 11 Aug 2022 21:42  Patient On (Oxygen Delivery Method): room air    
Vital Signs Last 24 Hrs  T(C): 36.6 (13 Aug 2022 05:00), Max: 36.6 (13 Aug 2022 05:00)  T(F): 97.9 (13 Aug 2022 05:00), Max: 97.9 (13 Aug 2022 05:00)  HR: 62 (13 Aug 2022 05:00) (62 - 75)  BP: 121/70 (13 Aug 2022 05:00) (121/70 - 122/73)  BP(mean): --  RR: 16 (13 Aug 2022 05:00) (16 - 16)  SpO2: 99% (13 Aug 2022 05:00) (99% - 100%)    Parameters below as of 13 Aug 2022 05:00  Patient On (Oxygen Delivery Method): room air

## 2022-08-15 NOTE — CHART NOTE - NSCHARTNOTEFT_GEN_A_CORE
patient in agreement to be transferred to [facility]  covid unit until isolation of 10 days completed per [facility] policy
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
## 2022-08-15 NOTE — BH CONSULTATION LIAISON PROGRESS NOTE - NSBHATTESTBILLINGAW_PSY_A_CORE
[id number] Inpatient care - high complexity - 35 minutes
Non-billable
Non-billable
[id number] Inpatient care - high complexity - 35 minutes
[id number] Inpatient care - high complexity - 35 minutes

## 2022-08-15 NOTE — PROGRESS NOTE ADULT - PROBLEM SELECTOR PLAN 3
Likely a false + admission though had prior contact at [place] 8/1 with 3 Covid+ patients.  Asymptomatic at this time. Covid vax x1 3/2021.  - Not at high risk of progressing to severe COVID19 therefore not requiring Paxlovid or Remdesivir  - CTM for respiratory status/symptoms  - D-dimer elevated to 760  - lovenox 40mg BID
Prior contact at [place] 8/1 with 3 Covid+ patients.  Asymptomatic at this time. Covid vax x1 3/2021.  - Not at high risk of progressing to severe COVID19 therefore not requiring Paxlovid or Remdesivir  - CTM for respiratory status/symptoms  - D-dimer elevated to 760  - lovenox 40mg BID  - Will repeat COVID swab Sunday to confirm negative
Likely a false + admission though had prior contact at [place] 8/1 with 3 Covid+ patients.  Asymptomatic at this time. Covid vax x1 3/2021.  - Not at high risk of progressing to severe COVID19 therefore not requiring Paxlovid or Remdesivir  - CTM for respiratory status/symptoms  - D-dimer elevated to 760  - lovenox 40mg BID
Prior contact at [place] 8/1 with 3 Covid+ patients.  Asymptomatic at this time. Covid vax x1 3/2021.  - Not at high risk of progressing to severe COVID19 therefore not requiring Paxlovid or Remdesivir  - CTM for respiratory status/symptoms  - D-dimer elevated to 760  - lovenox 40mg BID  - Will repeat COVID swab Sunday to confirm negative
Prior contact at [place] 8/1 with 3 Covid+ patients. Asymptomatic at this time. Covid vax x1 3/2021.  - Not at high risk of progressing to severe COVID19 therefore not requiring Paxlovid or Remdesivir  - CTM for respiratory status/symptoms  - D-dimer elevated to 760  - lovenox 40mg BID

## 2022-08-15 NOTE — PROGRESS NOTE ADULT - PROBLEM SELECTOR PLAN 4
DVT PPx: Lovenox 40 mg daily   Diet: Regular  Code: Full code  Dispo: no PT needed  Communication: mother (Cami [phone]) contacted 8/12
DVT PPx: Lovenox 40mg BID  Diet: Regular  Code: Full code  Dispo: no PT needed  Communication: mother (Cami [phone]) contacted 8/12
DVT PPx: Lovenox 40mg BID  Diet: Regular  Code: Full code  Dispo: no PT needed  Communication: mother (Cami [phone]) contacted 8/11
DVT PPx: Lovenox 40 mg daily   Diet: Regular  Code: Full code  Dispo: Pending transfer to [facility]+ unit
DVT PPx: Lovenox 40mg BID  Diet: Regular  Code: Full code  Dispo: no PT needed  Communication: mother (Cami [phone]) contacted 8/11

## 2022-08-15 NOTE — BH CONSULTATION LIAISON PROGRESS NOTE - NSBHATTESTCOMMENTATTENDFT_PSY_A_CORE
Chart reviewed, pt. seen/evaluated,  I agree with above assessment/plan. Patient oriented, cooperative, presenting with worsening depression and s/p SA.  Currently denies SI and HI, plan as above, case coordinated with Dr. Mayes, will follow
Chart reviewed, pt. seen/evaluated, I agree with above assessment/plan. Patient oriented, superficially cooperative,  expressed frustration being in the hospital, denies current SI and HI, amenable to be transfer to [facility] for further stability/medication management, no rigidity on exam, plan as above, will follow
Chart reviewed. Pt seen with fellow. Agree with above assessment. No psych contraindications to transfer to in psych.

## 2022-08-15 NOTE — BH INPATIENT PSYCHIATRY ASSESSMENT NOTE - RISK ASSESSMENT
Static risks include two suicide attempts. Modifiable risks include not satisfied with current outpatient treatment, hopelessness, depressive episode, auditory hallucinations (no commands), unable to safety plan. Protective factors include help seeking, future orientation, supportive family, no prior psych admissions. Pt is at an acute moderately elevated risk and chronically elevated risk of suicide and would benefit from inpatient psychiatric admission at this time.

## 2022-08-15 NOTE — PROGRESS NOTE ADULT - ASSESSMENT
26M hx of MDD w/ psychotic features, PTSD, and recent suicide attempt admitted for asymptomatic COVID19+ status seeking transfer into [facility] for depression and SI treatment.
26M hx of MDD w/ psychotic features, PTSD, and recent suicide attempt admitted for asymptomatic COVID19+ status seeking transfer into [facility] for depression and SI treatment.
26M hx of MDD w/ psychotic features, PTSD, and recent suicide attempt admitted for asymptomatic COVID19+ status seeking transfer into [facility] for depression and SI treatment.
26M hx of MDD w/ psychotic features, PTSD, and recent suicide attempt admitted for asymptomatic COVID19+ status seeking transfer into [facility] for depression and SI treatment.
26M hx of MDD w/ psychotic features, PTSD, and recent suicide attempt admitted for asymptomatic COVID19+ status seeking transfer into [facility] for depression and SI treatment.

## 2022-08-15 NOTE — PROGRESS NOTE ADULT - REASON FOR ADMISSION
MDD w/ psychosis Covid+ status

## 2022-08-15 NOTE — BH PATIENT PROFILE - FALL HARM RISK - FALLEN IN PAST
Pt is a 17yo Male with a history of tonsillitis who presents with sore throat x 1 day. Patient and mother present at bedside states that he has been recovering from COVID when he developed a sore throat suddenly yesterday. States that since onset, he has been unable to tolerate liquids or food. Also endorses trismus and slight change to his voice. Of note, patient was previously seen on 7/12 for similar symptoms and discharged home on PO antibiotics and steroids. He was later evaluated by Dr. Garcia outpatient where he completed another course fo antibiotics and steroids. Mother states that this is his third episode of tonsillitis in two months and would like to discuss a tonsillectomy. Denies fevers, chills, n/v.     PAST MEDICAL & SURGICAL HISTORY:  Tonsillitis    Allergies  No Known Allergies    REVIEW OF SYSTEMS   [x] A ten-point review of systems was otherwise negative except as noted.    Vital Signs Last 24 Hrs  T(C): 37.2 (07 Aug 2022 16:26), Max: 37.2 (07 Aug 2022 16:26)  T(F): 98.9 (07 Aug 2022 16:26), Max: 98.9 (07 Aug 2022 16:26)  HR: 76 (07 Aug 2022 16:26) (76 - 76)  BP: 135/62 (07 Aug 2022 16:26) (135/62 - 135/62)  RR: 20 (07 Aug 2022 16:26) (20 - 20)  SpO2: 98% (07 Aug 2022 16:26) (98% - 98%)    Parameters below as of 07 Aug 2022 16:26  Patient On (Oxygen Delivery Method): room air    PHYSICAL EXAM:  GEN: No acute distress, awake and alert. No drooling or pooling of secretions. No stridor or stertor. Muffled voice.   SKIN: Good color, non diaphoretic.  HEENT: Trismus. Oral mucosa pink and moist. 3 tonsils bilaterally, erythematous with exudates. Mild erythema to left peritonsillar space, nontender when palpating area with tongue depressor, no fluctuance noted.  NECK: Trachea midline. Neck supple. Left neck tender to palpation.  RESP: No dyspnea, non-labored breathing. No use of accessory muscles.   CARDIO: +S1/S2  ABDO: Soft, NT.  EXT: BUCHANAN x 4    LABS:  Pending    RADIOLOGY & ADDITIONAL STUDIES:  None ordered Pt is a 19yo Male with a history of tonsillitis who presents with sore throat x 1 day. Patient and mother present at bedside states that he has been recovering from COVID when he developed a sore throat suddenly yesterday. States that since onset, he has been unable to tolerate liquids or food. Also endorses trismus and slight change to his voice. Of note, patient was previously seen on 7/12 for similar symptoms and discharged home on PO antibiotics and steroids. He was later evaluated by Dr. Garcia outpatient where he completed another course of antibiotics and steroids. Mother states that this is his third episode of tonsillitis in two months and would like to discuss a tonsillectomy. Denies fevers, chills, n/v.     PAST MEDICAL & SURGICAL HISTORY:  Tonsillitis    Allergies  No Known Allergies    REVIEW OF SYSTEMS   [x] A ten-point review of systems was otherwise negative except as noted.    Vital Signs Last 24 Hrs  T(C): 37.2 (07 Aug 2022 16:26), Max: 37.2 (07 Aug 2022 16:26)  T(F): 98.9 (07 Aug 2022 16:26), Max: 98.9 (07 Aug 2022 16:26)  HR: 76 (07 Aug 2022 16:26) (76 - 76)  BP: 135/62 (07 Aug 2022 16:26) (135/62 - 135/62)  RR: 20 (07 Aug 2022 16:26) (20 - 20)  SpO2: 98% (07 Aug 2022 16:26) (98% - 98%)    Parameters below as of 07 Aug 2022 16:26  Patient On (Oxygen Delivery Method): room air    PHYSICAL EXAM:  GEN: No acute distress, awake and alert. No drooling or pooling of secretions. No stridor or stertor. Muffled voice.   SKIN: Good color, non diaphoretic.  HEENT: Trismus. Oral mucosa pink and moist. 3 tonsils bilaterally, erythematous with exudates. Mild erythema to left peritonsillar space, nontender when palpating area with tongue depressor, no fluctuance noted.  NECK: Trachea midline. Neck supple. Left neck tender to palpation.  RESP: No dyspnea, non-labored breathing. No use of accessory muscles.   CARDIO: +S1/S2  ABDO: Soft, NT.  EXT: BUCHANAN x 4    LABS:  Pending    RADIOLOGY & ADDITIONAL STUDIES:  None ordered No

## 2022-08-15 NOTE — PROGRESS NOTE ADULT - PROBLEM SELECTOR PROBLEM 3
Asymptomatic COVID-19 virus infection

## 2022-08-15 NOTE — BH INPATIENT PSYCHIATRY ASSESSMENT NOTE - DETAILS
see HPI    Pt has had SIIP in the last week with recent attempt on Sunday. However, denies current SIIP brother recently discharged from inpatient psych

## 2022-08-15 NOTE — BH INPATIENT PSYCHIATRY ASSESSMENT NOTE - NSBHCHARTREVIEWVS_PSY_A_CORE FT
Vital Signs Last 24 Hrs  T(C): 36.7 (08-15-22 @ 16:49), Max: 36.7 (08-15-22 @ 16:49)  T(F): 98 (08-15-22 @ 16:49), Max: 98 (08-15-22 @ 16:49)  HR: 84 (08-15-22 @ 13:01) (84 - 84)  BP: 127/73 (08-15-22 @ 13:01) (109/69 - 127/73)  BP(mean): --  RR: 17 (08-15-22 @ 13:01) (17 - 18)  SpO2: 99% (08-15-22 @ 13:01) (98% - 99%)    Orthostatic VS  08-15-22 @ 16:49  Lying BP: --/-- HR: --  Sitting BP: 121/73 HR: 93  Standing BP: 113/80 HR: 102  Site: --  Mode: --

## 2022-08-16 LAB
A1C WITH ESTIMATED AVERAGE GLUCOSE RESULT: 4.9 % — SIGNIFICANT CHANGE UP (ref 4–5.6)
CHOLEST SERPL-MCNC: 152 MG/DL — SIGNIFICANT CHANGE UP
ESTIMATED AVERAGE GLUCOSE: 94 — SIGNIFICANT CHANGE UP
HDLC SERPL-MCNC: 42 MG/DL — SIGNIFICANT CHANGE UP
LIPID PNL WITH DIRECT LDL SERPL: 83 MG/DL — SIGNIFICANT CHANGE UP
NON HDL CHOLESTEROL: 110 MG/DL — SIGNIFICANT CHANGE UP
TRIGL SERPL-MCNC: 136 MG/DL — SIGNIFICANT CHANGE UP

## 2022-08-16 PROCEDURE — 99231 SBSQ HOSP IP/OBS SF/LOW 25: CPT

## 2022-08-16 PROCEDURE — 99222 1ST HOSP IP/OBS MODERATE 55: CPT

## 2022-08-16 PROCEDURE — 93010 ELECTROCARDIOGRAM REPORT: CPT

## 2022-08-16 RX ORDER — SERTRALINE 25 MG/1
25 TABLET, FILM COATED ORAL DAILY
Refills: 0 | Status: DISCONTINUED | OUTPATIENT
Start: 2022-08-17 | End: 2022-08-18
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
## 2022-08-16 NOTE — PSYCHIATRIC REHAB INITIAL EVALUATION - NSBHEMPPOSITIONFT_PSY_ALL_CORE
Total Number Of Aks Treated: 1 Duration Of Freeze Thaw-Cycle (Seconds): 10 Post-Care Instructions: I reviewed with the patient in detail post-care instructions. Patient is to wear sunprotection, and avoid picking at any of the treated lesions. Pt may apply Vaseline to crusted or scabbing areas. Render Post-Care Instructions In Note?: no Number Of Freeze-Thaw Cycles: 2 freeze-thaw cycles Consent: The patient's consent was obtained including but not limited to risks of crusting, scabbing, blistering, scarring, darker or lighter pigmentary change, recurrence, incomplete removal and infection. Detail Level: [employer]
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
## 2022-08-16 NOTE — BH INPATIENT PSYCHIATRY PROGRESS NOTE - NSBHMETABOLIC_PSY_ALL_CORE_FT
BMI: BMI (kg/m2): 27.4 (08-15-22 @ 16:49)  HbA1c: A1C with Estimated Average Glucose Result: 4.9 % (08-16-22 @ 10:00)    Glucose:   BP: --  Lipid Panel: Date/Time: 08-16-22 @ 10:00  Cholesterol, Serum: 152  Direct LDL: --  HDL Cholesterol, Serum: 42  Total Cholesterol/HDL Ration Measurement: --  Triglycerides, Serum: 136   BMI: BMI (kg/m2): 27.4 (08-15-22 @ 16:49)  HbA1c: A1C with Estimated Average Glucose Result: 4.9 % (08-16-22 @ 10:00)    Glucose:   BP: 127/74 (08-17-22 @ 20:24) (127/74 - 127/74)  Lipid Panel: Date/Time: 08-16-22 @ 10:00  Cholesterol, Serum: 152  Direct LDL: --  HDL Cholesterol, Serum: 42  Total Cholesterol/HDL Ration Measurement: --  Triglycerides, Serum: 136

## 2022-08-16 NOTE — BH INPATIENT PSYCHIATRY PROGRESS NOTE - CURRENT MEDICATION
MEDICATIONS  (STANDING):  risperiDONE   Tablet 1 milliGRAM(s) Oral at bedtime    MEDICATIONS  (PRN):  diphenhydrAMINE 50 milliGRAM(s) Oral every 6 hours PRN EPS  diphenhydrAMINE Injectable 50 milliGRAM(s) IntraMuscular once PRN EPS  haloperidol     Tablet 5 milliGRAM(s) Oral every 4 hours PRN agitation  haloperidol    Injectable 5 milliGRAM(s) IntraMuscular once PRN combative behavior  LORazepam     Tablet 1 milliGRAM(s) Oral every 4 hours PRN anxiety/ agitation  LORazepam   Injectable 2 milliGRAM(s) IntraMuscular once PRN combative behavior  melatonin. 3 milliGRAM(s) Oral at bedtime PRN Insomnia   MEDICATIONS  (STANDING):    MEDICATIONS  (PRN):  diphenhydrAMINE 50 milliGRAM(s) Oral every 6 hours PRN EPS  diphenhydrAMINE Injectable 50 milliGRAM(s) IntraMuscular once PRN EPS  haloperidol     Tablet 5 milliGRAM(s) Oral every 4 hours PRN agitation  haloperidol    Injectable 5 milliGRAM(s) IntraMuscular once PRN combative behavior  LORazepam     Tablet 1 milliGRAM(s) Oral every 4 hours PRN anxiety/ agitation  LORazepam   Injectable 2 milliGRAM(s) IntraMuscular once PRN combative behavior  melatonin. 3 milliGRAM(s) Oral at bedtime PRN Insomnia   MEDICATIONS  (STANDING):  sertraline 25 milliGRAM(s) Oral daily    MEDICATIONS  (PRN):  diphenhydrAMINE 50 milliGRAM(s) Oral every 6 hours PRN EPS  diphenhydrAMINE Injectable 50 milliGRAM(s) IntraMuscular once PRN EPS  haloperidol     Tablet 5 milliGRAM(s) Oral every 4 hours PRN agitation  haloperidol    Injectable 5 milliGRAM(s) IntraMuscular once PRN combative behavior  LORazepam     Tablet 1 milliGRAM(s) Oral every 4 hours PRN anxiety/ agitation  LORazepam   Injectable 2 milliGRAM(s) IntraMuscular once PRN combative behavior  melatonin. 3 milliGRAM(s) Oral at bedtime PRN Insomnia

## 2022-08-16 NOTE — CONSULT NOTE ADULT - ASSESSMENT
26M hx of MDD w/ psychotic features, PTSD, and recent suicide attempt admitted for asymptomatic COVID19+ status seeking transfer into [facility] for depression and SI treatment.      #Asymptomatic COVID-19 virus infection  COVID + on 8/10   Had prior contact at [facility] 8/1 with 3 Covid+ patients.    Asymptomatic at this time. Covid vax x1 3/2021.  - Not at high risk of progressing to severe COVID19 therefore not requiring Paxlovid or Remdesivir  - CTM for respiratory status/symptoms      #Severe recurrent major depressive disorder with psychotic features.   As per psych, primary team     #Suicidal ideation.   As per psych, primary team

## 2022-08-16 NOTE — PSYCHIATRIC REHAB INITIAL EVALUATION - NSBHEMPDEFICITSFT_PSY_ALL_CORE
Patient is currently on a leave of absence for work and expressed concern that he will lose his position.

## 2022-08-16 NOTE — BH INPATIENT PSYCHIATRY PROGRESS NOTE - NSBHCHARTREVIEWVS_PSY_A_CORE FT
Vital Signs Last 24 Hrs  T(C): 36.6 (08-16-22 @ 08:41), Max: 36.7 (08-15-22 @ 16:49)  T(F): 97.9 (08-16-22 @ 08:41), Max: 98 (08-15-22 @ 16:49)  HR: --  BP: --  BP(mean): --  RR: 17 (08-16-22 @ 08:41) (17 - 17)  SpO2: --    Orthostatic VS  08-16-22 @ 08:41  Lying BP: --/-- HR: --  Sitting BP: 117/57 HR: 70  Standing BP: 120/62 HR: 72  Site: --  Mode: --  Orthostatic VS  08-15-22 @ 16:49  Lying BP: --/-- HR: --  Sitting BP: 121/73 HR: 93  Standing BP: 113/80 HR: 102  Site: --  Mode: --   Vital Signs Last 24 Hrs  T(C): 36.6 (08-16-22 @ 08:41), Max: 36.6 (08-16-22 @ 08:41)  T(F): 97.9 (08-16-22 @ 08:41), Max: 97.9 (08-16-22 @ 08:41)  HR: --  BP: --  BP(mean): --  RR: 17 (08-16-22 @ 08:41) (17 - 17)  SpO2: --    Orthostatic VS  08-16-22 @ 08:41  Lying BP: --/-- HR: --  Sitting BP: 117/57 HR: 70  Standing BP: 120/62 HR: 72  Site: --  Mode: --  Orthostatic VS  08-15-22 @ 16:49  Lying BP: --/-- HR: --  Sitting BP: 121/73 HR: 93  Standing BP: 113/80 HR: 102  Site: --  Mode: --   Vital Signs Last 24 Hrs  T(C): 36.8 (08-18-22 @ 08:39), Max: 36.9 (08-17-22 @ 20:24)  T(F): 98.2 (08-18-22 @ 08:39), Max: 98.4 (08-17-22 @ 20:24)  HR: --  BP: 127/74 (08-17-22 @ 20:24) (127/74 - 127/74)  BP(mean): 84 (08-17-22 @ 20:24) (84 - 84)  RR: 17 (08-18-22 @ 08:39) (17 - 17)  SpO2: --    Orthostatic VS  08-18-22 @ 08:39  Lying BP: --/-- HR: --  Sitting BP: 116/65 HR: 76  Standing BP: 122/70 HR: 103  Site: --  Mode: --  Orthostatic VS  08-17-22 @ 07:30  Lying BP: --/-- HR: --  Sitting BP: 106/62 HR: 74  Standing BP: 104/60 HR: 76  Site: --  Mode: --
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
## 2022-08-16 NOTE — CONSULT NOTE ADULT - SUBJECTIVE AND OBJECTIVE BOX
HPI: 26 year old male with Hx of MDD  with/ pyschotic features , PTSD, and recent suicide attempt admitted for depression and SI treatment. Asymptomatic COVID +.  Denies fever, chills , chest pain, shortness of breath, abdominal pain, diarrhea.     PAST MEDICAL & SURGICAL HISTORY:  Depression      PTSD (post-traumatic stress disorder)      No significant past surgical history        Review of Systems:   CONSTITUTIONAL: No fever, weight loss, or fatigue  EYES: No eye pain, visual disturbances, or discharge  ENMT:  No difficulty hearing, tinnitus, vertigo; No sinus or throat pain  NECK: No pain or stiffness  RESPIRATORY: No cough, wheezing, chills or hemoptysis; No shortness of breath  CARDIOVASCULAR: No chest pain, palpitations, dizziness, or leg swelling  GASTROINTESTINAL: No abdominal or epigastric pain. No nausea, vomiting, or hematemesis; No diarrhea or constipation. No melena or hematochezia.  GENITOURINARY: No dysuria, frequency, hematuria, or incontinence  NEUROLOGICAL: No headaches, memory loss, loss of strength, numbness, or tremors  SKIN: No itching, burning, rashes, or lesions   MUSCULOSKELETAL: No joint pain or swelling; No muscle, back, or extremity pain  HEME/LYMPH: No easy bruising, or bleeding gums  ALLERY AND IMMUNOLOGIC: No hives or eczema    Allergies    No Known Allergies    Intolerances      Social History:     FAMILY HISTORY:  Family history of bipolar disorder (Sibling)      MEDICATIONS  (STANDING):  risperiDONE   Tablet 1 milliGRAM(s) Oral at bedtime    MEDICATIONS  (PRN):  diphenhydrAMINE 50 milliGRAM(s) Oral every 6 hours PRN EPS  diphenhydrAMINE Injectable 50 milliGRAM(s) IntraMuscular once PRN EPS  haloperidol     Tablet 5 milliGRAM(s) Oral every 4 hours PRN agitation  haloperidol    Injectable 5 milliGRAM(s) IntraMuscular once PRN combative behavior  LORazepam     Tablet 1 milliGRAM(s) Oral every 4 hours PRN anxiety/ agitation  LORazepam   Injectable 2 milliGRAM(s) IntraMuscular once PRN combative behavior  melatonin. 3 milliGRAM(s) Oral at bedtime PRN Insomnia      Vital Signs Last 24 Hrs  T(C): 36.6 (16 Aug 2022 08:41), Max: 36.7 (15 Aug 2022 16:49)  T(F): 97.9 (16 Aug 2022 08:41), Max: 98 (15 Aug 2022 16:49)  BP: --117/57  RR: 17 (16 Aug 2022 08:41) (17 - 17)    CAPILLARY BLOOD GLUCOSE      PHYSICAL EXAM:  GENERAL: NAD, well-developed  HEAD:  Atraumatic, Normocephalic  EYES: EOMI, conjunctiva and sclera clear  NECK: Supple, No JVD  CHEST/LUNG: Clear to auscultation bilaterally; No wheeze  HEART: Regular rate and rhythm; No murmurs, rubs, or gallops  ABDOMEN: Soft, Nontender, Nondistended; Bowel sounds present  EXTREMITIES:  2+ Peripheral Pulses, No clubbing, cyanosis, or edema  NEUROLOGY: non-focal  SKIN: No rashes or lesions  Care Discussed with Consultants/Other Providers:

## 2022-08-16 NOTE — BH INPATIENT PSYCHIATRY PROGRESS NOTE - PRN MEDS
MEDICATIONS  (PRN):  diphenhydrAMINE 50 milliGRAM(s) Oral every 6 hours PRN EPS  diphenhydrAMINE Injectable 50 milliGRAM(s) IntraMuscular once PRN EPS  haloperidol     Tablet 5 milliGRAM(s) Oral every 4 hours PRN agitation  haloperidol    Injectable 5 milliGRAM(s) IntraMuscular once PRN combative behavior  LORazepam     Tablet 1 milliGRAM(s) Oral every 4 hours PRN anxiety/ agitation  LORazepam   Injectable 2 milliGRAM(s) IntraMuscular once PRN combative behavior  melatonin. 3 milliGRAM(s) Oral at bedtime PRN Insomnia

## 2022-08-16 NOTE — BH TREATMENT PLAN - NSTXDEPRESINTERPR_PSY_ALL_CORE
Over the next 7 days, Psychiatric Rehabilitation staff will utilize group and individual psychotherapy, and psychoeducation to assist the patient in reaching his treatment goal.

## 2022-08-17 PROCEDURE — 93010 ELECTROCARDIOGRAM REPORT: CPT

## 2022-08-17 PROCEDURE — 99238 HOSP IP/OBS DSCHRG MGMT 30/<: CPT

## 2022-08-17 PROCEDURE — 90837 PSYTX W PT 60 MINUTES: CPT

## 2022-08-17 RX ORDER — SERTRALINE 25 MG/1
1 TABLET, FILM COATED ORAL
Qty: 14 | Refills: 0
Start: 2022-08-17 | End: 2022-08-30

## 2022-08-17 RX ADMIN — SERTRALINE 25 MILLIGRAM(S): 25 TABLET, FILM COATED ORAL at 08:20

## 2022-08-17 NOTE — BH PSYCHOLOGY - CLINICIAN PSYCHOTHERAPY NOTE - NSBHPSYCHOLINT_PSY_A_CORE
Dialectical  Behavioral Therapy (DBT)/Supported coping skills/Supportive therapy/Treatment compliance encouraged
Dialectical  Behavioral Therapy (DBT)/Supportive therapy/Treatment compliance encouraged

## 2022-08-17 NOTE — BH INPATIENT PSYCHIATRY DISCHARGE NOTE - ATTENDING DISCHARGE PHYSICAL EXAMINATION:
Patient is seen and evaluated, chart is reviewed and discussed with treatment team.  Patient is psychiatrically and medically stable for discharge, future oriented and able to safety plan.  Patient has submitted a 3 day letter and does not meet criteria for further retention as per mental health hygiene law, no acute signs or symptoms of dangerousness noted.  The patient has continued to show improvement in symptoms.  He states his depression is much improved, and he denies any anxiety.  He denies any SI, and his behavior has been well controlled.  The patient has been sleeping better, without nightmares.  The patient has been fully engaged in treatment on the unit, compliant with medications, and is looking forward to continuing care as an outpatient.  The patient has support from his family, who are also protective factors for him.  The patient’s risk cannot be further decreased with continued inpatient hospitalization.  He is no longer an acute danger to himself or others, and is stable for discharge home.

## 2022-08-17 NOTE — BH INPATIENT PSYCHIATRY DISCHARGE NOTE - NSDCCPCAREPLAN_GEN_ALL_CORE_FT
PRINCIPAL DISCHARGE DIAGNOSIS  Diagnosis: MDD (major depressive disorder), recurrent severe, without psychosis  Assessment and Plan of Treatment:        PRINCIPAL DISCHARGE DIAGNOSIS  Diagnosis: Major depressive disorder  Assessment and Plan of Treatment:

## 2022-08-17 NOTE — BH PSYCHOLOGY - CLINICIAN PSYCHOTHERAPY NOTE - TOKEN PULL-DIAGNOSIS
Primary Diagnosis:  Depression [F32.A]        Problem Dx:   
Primary Diagnosis:  Depression [F32.A]        Problem Dx:

## 2022-08-17 NOTE — BH INPATIENT PSYCHIATRY PROGRESS NOTE - NSBHCHARTREVIEWVS_PSY_A_CORE FT
Vital Signs Last 24 Hrs  T(C): 36.8 (08-17-22 @ 07:30), Max: 36.8 (08-17-22 @ 07:30)  T(F): 98.3 (08-17-22 @ 07:30), Max: 98.3 (08-17-22 @ 07:30)  HR: --  BP: --  BP(mean): --  RR: 17 (08-17-22 @ 07:30) (17 - 17)  SpO2: --    Orthostatic VS  08-17-22 @ 07:30  Lying BP: --/-- HR: --  Sitting BP: 106/62 HR: 74  Standing BP: 104/60 HR: 76  Site: --  Mode: --  Orthostatic VS  08-16-22 @ 08:41  Lying BP: --/-- HR: --  Sitting BP: 117/57 HR: 70  Standing BP: 120/62 HR: 72  Site: --  Mode: --  Orthostatic VS  08-15-22 @ 16:49  Lying BP: --/-- HR: --  Sitting BP: 121/73 HR: 93  Standing BP: 113/80 HR: 102  Site: --  Mode: --   Vital Signs Last 24 Hrs  T(C): 36.8 (08-18-22 @ 08:39), Max: 36.9 (08-17-22 @ 20:24)  T(F): 98.2 (08-18-22 @ 08:39), Max: 98.4 (08-17-22 @ 20:24)  HR: --  BP: 127/74 (08-17-22 @ 20:24) (127/74 - 127/74)  BP(mean): 84 (08-17-22 @ 20:24) (84 - 84)  RR: 17 (08-18-22 @ 08:39) (17 - 17)  SpO2: --    Orthostatic VS  08-18-22 @ 08:39  Lying BP: --/-- HR: --  Sitting BP: 116/65 HR: 76  Standing BP: 122/70 HR: 103  Site: --  Mode: --  Orthostatic VS  08-17-22 @ 07:30  Lying BP: --/-- HR: --  Sitting BP: 106/62 HR: 74  Standing BP: 104/60 HR: 76  Site: --  Mode: --
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
## 2022-08-17 NOTE — BH INPATIENT PSYCHIATRY DISCHARGE NOTE - NSBHMETABOLIC_PSY_ALL_CORE_FT
BMI: BMI (kg/m2): 27.4 (08-15-22 @ 16:49)  HbA1c: A1C with Estimated Average Glucose Result: 4.9 % (08-16-22 @ 10:00)    Glucose:   BP: --  Lipid Panel: Date/Time: 08-16-22 @ 10:00  Cholesterol, Serum: 152  Direct LDL: --  HDL Cholesterol, Serum: 42  Total Cholesterol/HDL Ration Measurement: --  Triglycerides, Serum: 136

## 2022-08-17 NOTE — BH PSYCHOLOGY - CLINICIAN PSYCHOTHERAPY NOTE - NSBHPSYCHOLGOALS_PSY_A_CORE
Decrease symptoms/Improve level of independent functioning/Improve social/vocational/coping skills/Prevent relapse/Psychoeducation
Decrease symptoms/Assessment/Improve level of independent functioning/Improve social/vocational/coping skills/Psychoeducation/Treatment compliance

## 2022-08-17 NOTE — BH INPATIENT PSYCHIATRY PROGRESS NOTE - NSBHFUPINTERVALHXFT_PSY_A_CORE
pt seen for f/u SA.  VSS .chart reviewed and case discussed with treatment team.  Pt states he is sleeping and eating well .  Pt stated he had went to   one group and had participated.  Had played games with peers. Pt report feeling more hopeful.    Denies SI/HI  without intent or plan.   Denies AH/ VH  Denies paranoia.  Pt is currently taking medications as prescribed.  Encouraged pt if symptoms regarding AH/ VH return during this admission to let staff  know.
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
## 2022-08-17 NOTE — BH INPATIENT PSYCHIATRY DISCHARGE NOTE - HOSPITAL COURSE
Dates of Hospitalization:     On presentation the unit the patient?    Risk benefit of medications was discussed, discussion regarding risks included but was not limited to NMS, TD, metabolic disturbances, and conduction abnormalities. The patient was in agreement with plan to start XXX??, which was titrated to XXX    Over the course of hospitalization the patient????      There were no behavioral problems on the unit.  Patient did not become agitated and did not require emergent intramuscular medications or seclusion/restraints.  Patient did not self-harm on the unit. Patient remained actively engaged in treatment. Patient participated in individual, group, and milieu therapy. Patient got along appropriately with staff and peers    On the day of dc the patient was no longer???     At the time of dc the patient and family were engaged in safety planning and denied safety concerns related to dispo, they verbalized willingness to call 911 or go to ER in the event that patient was felt to be a threat to self or others.      Risk Assessment:     There were no acute medical issues or events during the course of hospitalization.     Discharge Diagnosis:       Discharge Medications (the patient was provided with a 14 day supply of psychiatric medications upon dc):     diphenhydrAMINE 50 milliGRAM(s) Oral every 6 hours PRN  diphenhydrAMINE Injectable 50 milliGRAM(s) IntraMuscular once PRN  haloperidol     Tablet 5 milliGRAM(s) Oral every 4 hours PRN  haloperidol    Injectable 5 milliGRAM(s) IntraMuscular once PRN  LORazepam     Tablet 1 milliGRAM(s) Oral every 4 hours PRN  LORazepam   Injectable 2 milliGRAM(s) IntraMuscular once PRN  melatonin. 3 milliGRAM(s) Oral at bedtime PRN  sertraline 25 milliGRAM(s) Oral daily   Dates of Hospitalization: 8/15/22-8/18/22    on admission,  pt was on Risperdal.  Discussed with patient regarding adding zoloft for depressive symptoms.   Pt requested written material on medication which was given to him.  Pt requested if he could be taken off Risperdal and discussed the importance of continuing the Risperdal due to previous AH/VH.  Pt stated that had felt overwhelmed and felt it was all in his head, when admitted to [facility].    Risk benefit of medications was discussed, discussion regarding risks included but was not limited to NMS, TD, metabolic disturbances, and conduction abnormalities. The patient was in agreement with plan to start XXX??, which was titrated to XXX    Over the course of hospitalization the patient????      There were no behavioral problems on the unit.  Patient did not become agitated and did not require emergent intramuscular medications or seclusion/restraints.  Patient did not self-harm on the unit. Patient remained actively engaged in treatment. Patient participated in individual, group, and milieu therapy. Patient got along appropriately with staff and peers    On the day of dc the patient was no longer???     At the time of dc the patient and family were engaged in safety planning and denied safety concerns related to dispo, they verbalized willingness to call 911 or go to ER in the event that patient was felt to be a threat to self or others.      The patient is at elevated chronic risk for suicide/self harm/violence, but low acute risk for suicide/self harm/violence. Static risk factors include; psychiatric illness dx?, hx of hospitalizations?, history of SA?, hx of SIB?, hx of Violence?, hx of substance?, history of non-compliance?.....Acute risk factors present on admission but mitigated during hospitalization include; ???. Acute risk factors were mitigated during admission via medication tx?, I/G/M therapy, safety planning, and psychoeducation. Protective factors include; good response to tx, forward thinking regarding???, family?, engaged in tx? work/school?. Given protective factors, and that acute risk factors present on admission have been addressed and [facility], patient has returned to baseline level of acute risk and the patient no longer requires inpatient hospitalization for stabilization or safety. The patient is therefore appropriate for dc to outpatient care. Chronic risk can be further mitigated by continued engagement with outpatient tx, GIBSON?, substance?  At the time of dc the patient engaged meaningfully in safety planning and was dc home to outpatient tx.           Discharge Diagnosis:       Discharge Medications (the patient was provided with a 14 day supply of psychiatric medications upon dc):     diphenhydrAMINE 50 milliGRAM(s) Oral every 6 hours PRN  diphenhydrAMINE Injectable 50 milliGRAM(s) IntraMuscular once PRN  haloperidol     Tablet 5 milliGRAM(s) Oral every 4 hours PRN  haloperidol    Injectable 5 milliGRAM(s) IntraMuscular once PRN  LORazepam     Tablet 1 milliGRAM(s) Oral every 4 hours PRN  LORazepam   Injectable 2 milliGRAM(s) IntraMuscular once PRN  melatonin. 3 milliGRAM(s) Oral at bedtime PRN  sertraline 25 milliGRAM(s) Oral daily     Dates of Hospitalization: 8/15/22-8/18/22    on admission,  pt was on Risperdal. [facility] had prescribed Risperdal   Pt stated that during his admission at [facility] he stated ,he had felt overwhelmed, and be believed that his symptoms was all in his mind. Pt requested if he could be taken off Risperdal ,and discussed with the  pt the importance of continuing the Risperdal due to previous AH/ VH.   Discussed with patient regarding benefits of adding zoloft for depressive symptoms.   Pt requested written material on Zoloft which was given to him.  Pt in agreement to start Zoloft      Patient symptoms improved over the course of the hospitalization.  Patient improved in the symptoms of depression.  Pt would like to  return  back to work ,and seeing family.   There were no behavioral problems on the unit.  Patient did not become agitated and did not require emergent intramuscular medications or seclusion/restraints.  Patient did not self-harm on the unit. Patient remained actively engaged in treatment. Patient participated in group, and milieu therapy. Patient got along appropriately with staff and peers.  Pt symptoms of depression have improved.  Pt denies suicidal ideation without intent or plan.  Denies AH/ VH.  Pt encouraged to speak to provider if symptoms worsen in SI, or AH/ VH to contact provider/ contact 911.          The patient is at low risk for suicide/self harm/violence, Static risk factors include; psychiatric illness of MDD, hx of 1 hospitalizations, history of 1 SA , ....Acute risk factors present on admission but mitigated during hospitalization include; on admission SA.  Depressive symptoms . Patient had placed three day letter.  Patient symptoms currently in remission and denies suicidal ideation.  Pt is at low risk for suicide.  Pt is stable and safe for discharge.  Acute risk factors were mitigated during admission via medication started on Zoloft, G/M therapy, safety planning, and psychoeducation. Protective factors include; good response to therapy, forward thinking regarding medication and treatment plan, supportive family , engaged in groups,  returning to work. Given protective factors, and that acute risk factors present on admission have been addressed and are no longer present at discharge, patient has returned to baseline level of acute risk and the patient no longer requires inpatient hospitalization for stabilization or safety. The patient is therefore appropriate for discharge to outpatient care. Chronic risk can be further mitigated by continued engagement with outpatient treatment continuing to take Zoloft  At the time of discharge the patient engaged meaningfully in safety planning ,and was discharged  home to outpatient treatment. Pt encouraged  to follow medication regimen and not skip a dose.  pt verbalize understanding.          Discharge Diagnosis: MDD      Discharge Medications (the patient was provided with a 14 day supply of psychiatric medications upon dc):     sertraline 25 milliGRAM(s) Oral daily

## 2022-08-17 NOTE — BH INPATIENT PSYCHIATRY PROGRESS NOTE - CURRENT MEDICATION
MEDICATIONS  (STANDING):  sertraline 25 milliGRAM(s) Oral daily    MEDICATIONS  (PRN):  diphenhydrAMINE 50 milliGRAM(s) Oral every 6 hours PRN EPS  diphenhydrAMINE Injectable 50 milliGRAM(s) IntraMuscular once PRN EPS  haloperidol     Tablet 5 milliGRAM(s) Oral every 4 hours PRN agitation  haloperidol    Injectable 5 milliGRAM(s) IntraMuscular once PRN combative behavior  LORazepam     Tablet 1 milliGRAM(s) Oral every 4 hours PRN anxiety/ agitation  LORazepam   Injectable 2 milliGRAM(s) IntraMuscular once PRN combative behavior  melatonin. 3 milliGRAM(s) Oral at bedtime PRN Insomnia

## 2022-08-17 NOTE — BH INPATIENT PSYCHIATRY DISCHARGE NOTE - NSDCMRMEDTOKEN_GEN_ALL_CORE_FT
diphenhydrAMINE 50 mg oral capsule: 1 cap(s) orally every 6 hours, As needed, extrapyramidal sx  LORazepam 1 mg oral tablet: 1 tab(s) orally every 4 hours, As needed, anxiety/ agitation  melatonin 3 mg oral tablet: 1 tab(s) orally once a day (at bedtime) 20:00  risperiDONE 1 mg oral tablet: 1 tab(s) orally once a day (at bedtime)   sertraline 25 mg oral tablet: 1 tab(s) orally once a day

## 2022-08-17 NOTE — BH PSYCHOLOGY - CLINICIAN PSYCHOTHERAPY NOTE - NSBHPSYCHOLPROBS_PSY_ALL_CORE
Academic/Vocational/Social Dysfunction/Anxiety/Depression/Impulsivity/Suicidality
Academic/Vocational/Social Dysfunction/Depression/Impulsivity/Suicidality

## 2022-08-18 VITALS — TEMPERATURE: 98 F | RESPIRATION RATE: 17 BRPM

## 2022-08-18 PROCEDURE — 99231 SBSQ HOSP IP/OBS SF/LOW 25: CPT

## 2022-08-18 RX ADMIN — SERTRALINE 25 MILLIGRAM(S): 25 TABLET, FILM COATED ORAL at 09:35

## 2022-08-18 NOTE — BH DISCHARGE NOTE NURSING/SOCIAL WORK/PSYCH REHAB - NSDCPRGOAL_PSY_ALL_CORE
Writer met with patient to safety plan for discharge and discuss patient’s progress throughout his inpatient stay. Upon admission, patient presented with worsening depression, a suicide attempt, and was observed as tearful. During his inpatient stay, patient was visible on the unit, participated in most groups, and was engaged in treatment with staff. In groups, patient was pleasant, insightful, and motivated to complete all group activities. At discharge, patient presents as calm and reported looking forward to seeing his family and feeling hopeful for outpatient treatment. Patient met his psychiatric rehabilitation goal evidenced by his ability to identify going for walks, journaling, playing video games, and listening to music as effective coping skills. Patient exhibits medication compliance, good ADLs, and good behavioral control. Patient denied current SI, HI, AH, and VH.

## 2022-08-18 NOTE — BH INPATIENT PSYCHIATRY PROGRESS NOTE - NSBHATTESTAPPBILLTIME_PSY_A_CORE
I attest my time as JOSE is greater than 50% of the total combined time spent on qualifying patient care activities. I have reviewed and verified the documentation.

## 2022-08-18 NOTE — BH INPATIENT PSYCHIATRY PROGRESS NOTE - NSICDXBHTERTIARYDX_PSY_ALL_CORE
R/O MDD (major depressive disorder), single episode, severe with psychosis   F32.3  

## 2022-08-18 NOTE — BH DISCHARGE NOTE NURSING/SOCIAL WORK/PSYCH REHAB - NSDCPRRECOMMEND_PSY_ALL_CORE
Psychiatric Rehabilitation staff recommends that patient engage in outpatient services for continued medication and symptom management. Upon discharge, patient has an appointment scheduled with Supriya Santana at [facility].

## 2022-08-18 NOTE — BH INPATIENT PSYCHIATRY PROGRESS NOTE - NSBHMETABOLIC_PSY_ALL_CORE_FT
BMI: BMI (kg/m2): 27.4 (08-15-22 @ 16:49)  HbA1c: A1C with Estimated Average Glucose Result: 4.9 % (08-16-22 @ 10:00)    Glucose:   BP: 127/74 (08-17-22 @ 20:24) (127/74 - 127/74)  Lipid Panel: Date/Time: 08-16-22 @ 10:00  Cholesterol, Serum: 152  Direct LDL: --  HDL Cholesterol, Serum: 42  Total Cholesterol/HDL Ration Measurement: --  Triglycerides, Serum: 136

## 2022-08-18 NOTE — BH INPATIENT PSYCHIATRY PROGRESS NOTE - NSBHCONSBHPROVDETAILS_PSY_A_CORE  FT
pt discharge today.  attempted  to contact provider [facility]  [phone]  left KARINA  pt has appointment on saturday

## 2022-08-18 NOTE — BH INPATIENT PSYCHIATRY PROGRESS NOTE - CURRENT MEDICATION
MEDICATIONS  (STANDING):  sertraline 25 milliGRAM(s) Oral daily    MEDICATIONS  (PRN):  diphenhydrAMINE 50 milliGRAM(s) Oral every 6 hours PRN EPS  diphenhydrAMINE Injectable 50 milliGRAM(s) IntraMuscular once PRN EPS  haloperidol     Tablet 5 milliGRAM(s) Oral every 4 hours PRN agitation  haloperidol    Injectable 5 milliGRAM(s) IntraMuscular once PRN combative behavior  LORazepam     Tablet 1 milliGRAM(s) Oral every 4 hours PRN anxiety/ agitation  LORazepam   Injectable 2 milliGRAM(s) IntraMuscular once PRN combative behavior  melatonin. 3 milliGRAM(s) Oral at bedtime PRN Insomnia   MEDICATIONS  (STANDING):    MEDICATIONS  (PRN):

## 2022-08-18 NOTE — BH DISCHARGE NOTE NURSING/SOCIAL WORK/PSYCH REHAB - DISCHARGE INSTRUCTIONS AFTERCARE APPOINTMENTS
In order to check the location, date, or time of your aftercare appointment, please refer to your Discharge Instructions Document given to you upon leaving the hospital.  If you have lost the instructions please call [phone]

## 2022-08-18 NOTE — BH INPATIENT PSYCHIATRY PROGRESS NOTE - NSBHATTESTBILLINGAW_PSY_A_CORE
[id number] Inpatient care - low complexity - 15 minutes
[id number] Inpatient care - low complexity - 15 minutes
[id number] Inpatient care - low complexity - 15 minutes

## 2022-08-18 NOTE — BH DISCHARGE NOTE NURSING/SOCIAL WORK/PSYCH REHAB - PATIENT PORTAL LINK FT
You can access the FollowMyHealth Patient Portal offered by [facility] by registering at the following website: [email or web address]. By joining [employer]’s FollowMyHealth portal, you will also be able to view your health information using other applications (apps) compatible with our system.

## 2022-08-18 NOTE — BH INPATIENT PSYCHIATRY PROGRESS NOTE - NSBHFUPINTERVALHXFT_PSY_A_CORE
pt seen for f/u for SA.  pt stated he had felt his symptoms have improved since admission.  Pt denies  SI/HI without intent or plan.  pt stated he is motivated  to see family, girlfriend, going back to work.  Pt is motivated to continue his outpatient treatment.  Pt has been compliant with medication. Pt is engaged in  groups. Discussed with patient about being compliant with medication to not skip a dose.  As well as if symptoms worsen to contact provider/ call 911.    Discussed with patient regarding to continue Risperdal, pt declined.  Discussed with patient if symptoms of AH /VH  to contact provider pt seen for f/u for SA.  pt stated he had felt his symptoms have improved since admission.  Pt denies  SI/HI without intent or plan.  pt stated he is looking forward  to seeing family, girlfriend, going back to work.  Pt is motivated to continue his outpatient treatment.  Pt has been compliant with medications. Pt is engaged in  groups. Discussed with patient about being compliant with medication to not skip a dose.  As well as, if symptoms worsen to contact provider/ call 911.  Discussed with patient regarding to continue Risperdal, pt declined.  Discussed with patient if symptoms of AH /VH  to contact provider or worsen call 911.  Pt verbalize understanding. pt seen for f/u for SA. VSS pt stated he had felt his symptoms have improved since admission.  Pt denies  SI/HI without intent or plan.  pt stated he is looking forward to seeing family, girlfriend, going back to work.  Pt is motivated to continue his outpatient treatment.  Pt has been compliant with medications. Pt is engaged in groups. Discussed with patient about being compliant with medication to not skip a dose.  As well as, if symptoms worsen to contact provider/ call 911.  Discussed with patient regarding to continue Risperdal, pt declined.  Discussed with patient if symptoms of AH /VH  to contact provider or worsen call 911.  Pt verbalize understanding.

## 2022-08-18 NOTE — BH INPATIENT PSYCHIATRY PROGRESS NOTE - NSBHCHARTREVIEWVS_PSY_A_CORE FT
Vital Signs Last 24 Hrs  T(C): 36.8 (08-18-22 @ 08:39), Max: 36.9 (08-17-22 @ 20:24)  T(F): 98.2 (08-18-22 @ 08:39), Max: 98.4 (08-17-22 @ 20:24)  HR: --  BP: 127/74 (08-17-22 @ 20:24) (127/74 - 127/74)  BP(mean): 84 (08-17-22 @ 20:24) (84 - 84)  RR: 17 (08-18-22 @ 08:39) (17 - 17)  SpO2: --    Orthostatic VS  08-18-22 @ 08:39  Lying BP: --/-- HR: --  Sitting BP: 116/65 HR: 76  Standing BP: 122/70 HR: 103  Site: --  Mode: --  Orthostatic VS  08-17-22 @ 07:30  Lying BP: --/-- HR: --  Sitting BP: 106/62 HR: 74  Standing BP: 104/60 HR: 76  Site: --  Mode: --   Vital Signs Last 24 Hrs  T(C): 36.8 (08-18-22 @ 08:39), Max: 36.8 (08-18-22 @ 08:39)  T(F): 98.2 (08-18-22 @ 08:39), Max: 98.2 (08-18-22 @ 08:39)  HR: --  BP: --  BP(mean): --  RR: 17 (08-18-22 @ 08:39) (17 - 17)  SpO2: --    Orthostatic VS  08-18-22 @ 11:30  Lying BP: --/-- HR: --  Sitting BP: --/-- HR: 96  Standing BP: --/-- HR: 88  Site: --  Mode: --  Orthostatic VS  08-18-22 @ 08:39  Lying BP: --/-- HR: --  Sitting BP: 116/65 HR: 76  Standing BP: 122/70 HR: 103  Site: --  Mode: --  Orthostatic VS  08-17-22 @ 07:30  Lying BP: --/-- HR: --  Sitting BP: 106/62 HR: 74  Standing BP: 104/60 HR: 76  Site: --  Mode: --

## 2022-08-18 NOTE — BH INPATIENT PSYCHIATRY PROGRESS NOTE - NSBHFUPINTERVALCCFT_PSY_A_CORE
" Im doing ok"
" Im feeling a little anxious because Im getting discharged today"
" Im sad but I want to get help"

## 2022-08-18 NOTE — BH INPATIENT PSYCHIATRY PROGRESS NOTE - NSTXSUICIDINTERMD_PSY_ALL_CORE
started on SSRI, Sertraline/ group, milieu therapy

## 2022-08-18 NOTE — BH INPATIENT PSYCHIATRY PROGRESS NOTE - MSE UNSTRUCTURED FT
Appearance:   Attitude/Behavior: calm and cooperative  Psychomotor: no psychomotor agitation noted  Gait: steady  Quality of speech calm and cooperative  Mood: " I am sad but, I want to get help"  Affect type: congruent  Affect Range: constricted  Thought process: impaired reasoning  Thought Content: pt denies current active or passive  SI/HI     Perceptual: denies  Insight: fair  Judgement: fair  Impulse Control: normal
Appearance: pt dressed in casual attire, fair hygiene  Attitude/Behavior: calm and cooperative  Psychomotor: no psychomotor agitation noted  Gait: steady  Quality of speech calm and cooperative  Mood: " Im feeling ok  Affect type: congruent  Affect Range: full  Thought process:  normal reasoning  Thought Content: pt denies current active or passive  SI/HI     Perceptual: denies  Insight: fair  Judgement: fair  Impulse Control: normal
Appearance: pt dressed in casual attire, fair hygiene  Attitude/Behavior: calm and cooperative  Psychomotor: no psychomotor agitation noted  Gait: steady  Quality of speech calm and cooperative  Mood: " Im feeling a little anxious because im getting discharged today"  Affect type: congruent  Affect Range: full  Thought process:  normal reasoning  Thought Content: pt denies current active or passive  SI/HI     Perceptual: denies  Insight: fair  Judgement: fair  Impulse Control: normal

## 2022-08-18 NOTE — BH INPATIENT PSYCHIATRY PROGRESS NOTE - NSTXPROBDCOPLK_PSY_ALL_CORE
DISCHARGE ISSUE - LACK OF APPROPRIATE OUTPATIENT SERVICES
0 = independent

## 2022-08-18 NOTE — BH INPATIENT PSYCHIATRY PROGRESS NOTE - NSDCCRITERIA_PSY_ALL_CORE
Patient feels safe to go home, and no concern from psychiatric team of danger to self or others. 
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
## 2022-08-18 NOTE — BH DISCHARGE NOTE NURSING/SOCIAL WORK/PSYCH REHAB - NSCDUDCCRISIS_PSY_A_CORE
[facility]  1 [phone]) [facility]-WELL ([phone])  Text "WELL" to 18937  Website: www.[employer]/.[employer] 1 (938) 171-SOQY (9861) Website: www.safehorizon.org/.National Suicide Prevention Lifeline 3 (832) 385-5853/.  [employer]  1 (220) LIF[employer] (813-8018)/.  [facility]  [phone] [place] [address] [phone]   Hours:  Monday through Friday from 9 AM to 3 PM/.  [employer] - [facility] Crisis Line  4 [phone], Option 1

## 2022-08-18 NOTE — BH INPATIENT PSYCHIATRY PROGRESS NOTE - NSBHATTESTTYPEVISIT_PSY_A_CORE
On-site Attending supervising JOSE (99XXX codes)

## 2022-08-18 NOTE — BH INPATIENT PSYCHIATRY PROGRESS NOTE - PRN MEDS
MEDICATIONS  (PRN):  diphenhydrAMINE 50 milliGRAM(s) Oral every 6 hours PRN EPS  diphenhydrAMINE Injectable 50 milliGRAM(s) IntraMuscular once PRN EPS  haloperidol     Tablet 5 milliGRAM(s) Oral every 4 hours PRN agitation  haloperidol    Injectable 5 milliGRAM(s) IntraMuscular once PRN combative behavior  LORazepam     Tablet 1 milliGRAM(s) Oral every 4 hours PRN anxiety/ agitation  LORazepam   Injectable 2 milliGRAM(s) IntraMuscular once PRN combative behavior  melatonin. 3 milliGRAM(s) Oral at bedtime PRN Insomnia   MEDICATIONS  (PRN):
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
## 2022-08-31 RX ORDER — SERTRALINE 25 MG/1
1 TABLET, FILM COATED ORAL
Qty: 15 | Refills: 0
Start: 2022-08-31 | End: 2022-09-13

## 2022-09-21 RX ORDER — SERTRALINE 25 MG/1
1 TABLET, FILM COATED ORAL
Qty: 30 | Refills: 0
Start: 2022-09-21 | End: 2022-10-20

## 2022-10-03 NOTE — BH TREATMENT PLAN - NSTXSUICIDDATETRGT_PSY_ALL_CORE
[id number] [facility] Otolaryngology  Dr. Carlo Archer. Whitney Harris. Ms.Ed        Patient Name:  Nate Cortez  :  1956     CHIEF C/O:    Chief Complaint   Patient presents with    Follow-up     6 month LPR/RT ear fullness       HISTORY OBTAINED FROM:  patient    HISTORY OF PRESENT ILLNESS:       David Nephew is a 63y.o. year old female, here today for follow up known history of globus sensation associated LPR on PPI therapy greater than pulm status stable to wean to as needed PPI therapy. Patient does not complain of new chronic allergic rhinitis congestion with associated postnasal drainage. No current cosmetic fever chills nausea vomiting chest pain. No complaints of new concerns including epistaxis, no history of recurrent sinusitis. 10/3/22: Pt here for follow up of GERD and and rhinorrhea. Doing well with GERD with lifestyle modifications and after short PPI trial. Using astelin nightly and notices a big difference with the spray. Will intermittently take OTC allergy medication. Complains of watery eyes and rhinorrhea. Denies smoking, denies shortness of breath or chest pain. Does complain of right ear fullness and popping, intermittent. Denies recent sinus infections.        Past Medical History:   Diagnosis Date    Allergic rhinitis     Carotid artery stenosis     Hypertension     Vitamin B12 deficiency      Past Surgical History:   Procedure Laterality Date    CAROTID ENDARTERECTOMY Right 3/31/2022    RIGHT CAROTID ENDARTERECTOMY performed by Kate Barraza MD at [place] [address] BIOPSY  2018     LYMPH NODE BIOPSY       Current Outpatient Medications:     rosuvastatin (CRESTOR) 20 MG tablet, Take 1 tablet by mouth daily, Disp: 90 tablet, Rfl: 1    losartan-hydroCHLOROthiazide (HYZAAR) 100-12.5 MG per tablet, TAKE 1 TABLET DAILY, Disp: 90 tablet, Rfl: 1    albuterol sulfate  (90 Base) MCG/ACT inhaler, inhale 2 puffs by mouth INTO THE LUNGS SIX TIMES PER DAY if needed, Disp: 1 Inhaler, Rfl: 2    Multiple Vitamin (MULTI VITAMIN DAILY PO), Take 1 tablet by mouth daily , Disp: , Rfl:     aspirin 81 MG tablet, Take 81 mg by mouth daily, Disp: , Rfl:   Patient has no known allergies. Social History     Tobacco Use    Smoking status: Former     Packs/day: 1.00     Years: 25.00     Pack years: 25.00     Types: Cigarettes     Start date: 67     Quit date:      Years since quittin.7    Smokeless tobacco: Never   Vaping Use    Vaping Use: Never used   Substance Use Topics    Alcohol use: Yes     Comment: occassionally    Drug use: Not Currently     Family History   Problem Relation Age of Onset    High Blood Pressure Mother     Diabetes Father     Prostate Cancer Father     High Blood Pressure Father     Diabetes Sister     High Blood Pressure Sister     Stroke Neg Hx     Heart Attack Neg Hx     Elevated Lipids Neg Hx     Coronary Art Dis Neg Hx     Breast Cancer Neg Hx     Heart Failure Neg Hx        Review of Systems   Constitutional:  Negative for chills and fever. HENT:  Positive for congestion, rhinorrhea and sinus pain. Negative for ear discharge and hearing loss. Respiratory:  Negative for cough and shortness of breath. Cardiovascular:  Negative for chest pain and palpitations. Gastrointestinal:  Negative for vomiting. Skin:  Negative for rash. Allergic/Immunologic: Negative for environmental allergies. Neurological:  Negative for dizziness and headaches. Hematological:  Does not bruise/bleed easily. All other systems reviewed and are negative. BP (!) 157/84   Pulse 74   Ht 5' 4\" (1.626 m)   Wt 185 lb (83.9 kg)   BMI 31.76 kg/m²   Physical Exam  Vitals and nursing note reviewed. Constitutional:       Appearance: She is well-developed. HENT:      Head: Normocephalic and atraumatic.       Right Ear: Tympanic membrane and ear canal normal.      Left Ear: Tympanic membrane and ear canal normal.      Ears:      Comments: Mild cerumen impaction on right     Nose: No nasal deformity. Right Turbinates: Not enlarged or swollen. Left Turbinates: Not enlarged or swollen. Mouth/Throat:      Lips: No lesions. Tongue: No lesions. Tongue does not deviate from midline. Palate: No mass and lesions. Tonsils: No tonsillar exudate or tonsillar abscesses. Comments: Right marginal nerve weak from carotid artery surgery   Eyes:      Pupils: Pupils are equal, round, and reactive to light. Neck:      Thyroid: No thyromegaly. Trachea: No tracheal deviation. Cardiovascular:      Rate and Rhythm: Normal rate. Pulmonary:      Effort: Pulmonary effort is normal. No respiratory distress. Musculoskeletal:         General: Normal range of motion. Cervical back: Normal range of motion. Lymphadenopathy:      Cervical: No cervical adenopathy. Skin:     General: Skin is warm. Findings: No erythema. Neurological:      Mental Status: She is alert. Cranial Nerves: No cranial nerve deficit. IMPRESSION/PLAN:  History of intermittent episodes of hoarseness, with associated globus sensation improved with lifestyle modification and astelin daily. Discussed option to increase astelin to twice daily during bad allergy seasons. May also add flonase in future for ear complaints but will hold off for now. Follow up in 1 year. Dr. Maye Trevizo.  Otolaryngology Facial Plastic Surgery  :  [address] [place] [address] Otolaryngology/Facial Plastic Surgery Residency  Associate Clinical Professor:  Jimmie Gutierrez[address] Jose [address]  1956      I have discussed the case, including pertinent history and exam findings with the resident. I have seen and examined the patient and the key elements of the encounter have been performed by me. I agree with the assessment, plan and orders as documented by the resident.       Patient here for follow up of medical problems. Remainder of medical problems as per resident note.       [facility], DO  10/24/22 22-Aug-2022
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
## 2023-08-01 ENCOUNTER — EMERGENCY (EMERGENCY)
Facility: HOSPITAL | Age: 28
LOS: 1 days | Discharge: HOME | End: 2023-08-01
Admitting: EMERGENCY MEDICINE
Payer: SELF-PAY

## 2023-08-01 VITALS
SYSTOLIC BLOOD PRESSURE: 119 MMHG | DIASTOLIC BLOOD PRESSURE: 62 MMHG | WEIGHT: 214.95 LBS | TEMPERATURE: 98 F | HEART RATE: 75 BPM | RESPIRATION RATE: 20 BRPM | HEIGHT: 67 IN | OXYGEN SATURATION: 100 %
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
## 2023-08-01 DIAGNOSIS — F32.A DEPRESSION, UNSPECIFIED: ICD-10-CM

## 2023-08-01 DIAGNOSIS — S61.412A LACERATION WITHOUT FOREIGN BODY OF LEFT HAND, INITIAL ENCOUNTER: ICD-10-CM

## 2023-08-01 DIAGNOSIS — S69.92XA UNSPECIFIED INJURY OF LEFT WRIST, HAND AND FINGER(S), INITIAL ENCOUNTER: ICD-10-CM
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
## 2023-08-01 PROCEDURE — 73130 X-RAY EXAM OF HAND: CPT | Mod: 26,LT

## 2023-08-01 PROCEDURE — 73130 X-RAY EXAM OF HAND: CPT

## 2023-08-01 PROCEDURE — 99284 EMERGENCY DEPT VISIT MOD MDM: CPT | Mod: 25

## 2023-08-01 PROCEDURE — 99283 EMERGENCY DEPT VISIT LOW MDM: CPT | Mod: 25

## 2023-08-01 PROCEDURE — 12001 RPR S/N/AX/GEN/TRNK 2.5CM/<: CPT

## 2023-08-01 RX ORDER — CEPHALEXIN 500 MG
1 CAPSULE ORAL
Qty: 20 | Refills: 0
Start: 2023-08-01 | End: 2023-08-05

## 2023-08-01 RX ORDER — CEPHALEXIN 500 MG
500 CAPSULE ORAL ONCE
Refills: 0 | Status: COMPLETED | OUTPATIENT
Start: 2023-08-01 | End: 2023-08-01

## 2023-08-01 RX ADMIN — Medication 500 MILLIGRAM(S): at 14:08

## 2023-08-01 NOTE — ED PROVIDER NOTE - PATIENT PORTAL LINK FT
You can access the FollowMyHealth Patient Portal offered by [facility] by registering at the following website: [email or web address]. By joining [facility]’s FollowMyHealth portal, you will also be able to view your health information using other applications (apps) compatible with our system.

## 2023-08-01 NOTE — ED ADULT NURSE NOTE - NSFALLUNIVINTERV_ED_ALL_ED
Bed/Stretcher in lowest position, wheels locked, appropriate side rails in place/Call bell, personal items and telephone in reach/Instruct patient to call for assistance before getting out of bed/chair/stretcher/Non-slip footwear applied when patient is off stretcher/[place] to call system/Physically safe environment - no spills, clutter or unnecessary equipment/Purposeful proactive rounding/Room/bathroom lighting operational, light cord in reach

## 2023-08-01 NOTE — ED PROCEDURE NOTE - CPROC ED POST PROC CARE GUIDE1
bacitracin and sterile dressing/Verbal/written post procedure instructions were given to patient/caregiver./Instructed patient/caregiver to follow-up with primary care physician./Instructed patient/caregiver regarding signs and symptoms of infection./Keep the cast/splint/dressing clean and dry.

## 2023-08-01 NOTE — ED PROVIDER NOTE - PHYSICAL EXAMINATION
GENERAL: alert, NAD  HEAD:  EYE: anicteric sclera, no conj inject  ENT:  CV:  RRR  PULM:  normal RR and effort  GI:  :  SKIN: normal color and turgor  MSK: 1.5 cm linear laceration to radial aspect left index MCP. flexor and extensor tendon function intact.  no exposed tendon.  cap refill brisk to fingertip.  NEURO:  motor intact in MRU distributions.  decr sens to light touch radial aspect left index finger. GENERAL: alert, NAD  HEAD:  EYE: anicteric sclera, no conj inject  ENT:  CV:  RRR  PULM:  normal RR and effort  GI:  :  SKIN: normal color and turgor  MSK: 2 cm linear laceration to dorsoradial aspect left index MCP. flexor and extensor tendon function intact.  extensor tendon visualized through full ROM, intact.  cap refill brisk to fingertip.  NEURO:  motor intact in MRU distributions.  sharp-dull discrimination and 2-pt sensation intact. GENERAL: alert, NAD  HEAD:  EYE: anicteric sclera, no conj inject  ENT:  CV:  RRR  PULM:  normal RR and effort  GI:  :  SKIN: normal color and turgor  MSK: 2 cm linear laceration to dorsoradial aspect left index MCP. no foreign body found on careful exploration of wound.  flexor and extensor tendon function intact.  small defect in fascia, extensor tendon visualized through full ROM, found to be intact.  cap refill brisk to fingertip.  NEURO:  motor intact in MRU distributions.  sharp-dull discrimination and 2-pt sensation intact.

## 2023-08-01 NOTE — ED PROVIDER NOTE - OBJECTIVE STATEMENT
27-year-old RHD  male  past medical history depression (on Lexapro) complaining of left hand injury -  patient was installing a lock at work approximately 90 minutes earlier using a drill which slipped and caused a laceration to his left hand.  Laceration is located at the base of the left index finger, along the radial aspect of the MCP joint.  He reports some decreased sensation to the radial aspect of the left index finger.  Last tetanus shot was 4 years ago.  Bleeding is controlled.  No other complaints.

## 2023-08-01 NOTE — ED ADULT NURSE NOTE - OBJECTIVE STATEMENT
Pt presenting with L hand injury after drill "went through" his hand. States removed the drill. No active bleeding noted, hand is bandaged with gauze. Xray in process. +ROM

## 2023-08-01 NOTE — ED PROVIDER NOTE - CLINICAL SUMMARY MEDICAL DECISION MAKING FREE TEXT BOX
Lac dorsoradial left hand index MCP.  Small defect in fascia; extensor tendon intact.  Tiny FB vs flake of bone on XR. No foreign body found on exam. NVI.  Thoroughly irrigated wound.  Sutured repair done.  Abx prophylaxis given.  Tetanus up to date.

## 2023-08-01 NOTE — ED PROVIDER NOTE - NSFOLLOWUPINSTRUCTIONS_ED_ALL_ED_FT
Take antibiotic as prescribed.  Rest the hand, elevate frequently.  Keep dressing clean and dry.  After 48 hours start twice daily washing gently with soap and water.  Pat dry, apply bacitracin ointment and sterile dressing.  Suture removal in 12-14 days; can have this done in ER or by hand surgeon.  Return to the Emergency Department if you have any new or worsening symptoms (ie signs of infection), or if you have any concerns.  ====================  Care For Your Stitches    WHAT YOU NEED TO KNOW:    What are stitches? Stitches, or sutures, are used to close cuts and wounds on the skin. Stitches need to be removed after your wound has healed.      How do I care for my stitches?    Protect the stitches. You may need to cover your stitches with a bandage for 24 to 48 hours, or as directed. Do not bump or hit the suture area. This could open the wound. Do not trim or shorten the ends of your stitches. If they rub on your clothing, put a gauze bandage between the stitches and your clothes.    Clean the area as directed. Carefully wash your wound with soap and water. For mouth and lip wounds, rinse your mouth after meals and at bedtime. Ask your healthcare provider what to use to rinse your mouth. If you have a scalp wound, you may gently wash your hair every 2 days with mild shampoo. Do not use hair products, such as hair spray. Check your wound for signs of infection when you clean it. Signs include redness, swelling, and pus.    Keep the area dry as directed. Wait 12 to 24 hours after you receive your stitches before you take a shower. Take showers instead of baths. Do not take a bath or swim. Your healthcare provider will give you instructions for bathing with your stitches.  What can I do to help my wound heal?    Elevate your wound. Keep your wound above the level of your heart as often as you can. This will help decrease swelling and pain. Prop the area on pillows or blankets, if possible, to keep it elevated comfortably.    Limit activity. Do not stretch the skin around your wound. This will help prevent bleeding and swelling.  When should I seek immediate care?    Your stitches come apart.    Blood soaks through your bandages.    You suddenly cannot move your injured joint.    You have sudden numbness around your wound.    You see red streaks coming from your wound.  When should I contact my healthcare provider?    You have a fever and chills.    Your wound is red, warm, swollen, or leaking pus.    There is a bad smell coming from your wound.    You have increased pain in the wound area.    You have questions or concerns about your condition or care.  CARE AGREEMENT:    You have the right to help plan your care. Learn about your health condition and how it may be treated. Discuss treatment options with your healthcare providers to decide what care you want to receive. You always have the right to refuse treatment.

## 2023-08-01 NOTE — ED ADULT TRIAGE NOTE - CHIEF COMPLAINT QUOTE
Pt presents to ED c/o L hand injury at work. Pt reports  a drill slipped through his hand and injured his hand. Bleeding controlled. affected hand with gauze applied and bandage applied.

## 2023-08-01 NOTE — ED PROVIDER NOTE - CARE PROVIDER_API CALL
Roxi García [facility]  Plastic Surgery  [address] [place] [id number]  Phone: [phone]  Fax: [phone]  Follow Up Time:

## 2024-09-24 NOTE — ED BEHAVIORAL HEALTH ASSESSMENT NOTE - KNOWN PSYCHIATRIC ADMISSION WITHIN THE PAST 30 DAYS
Yes
Render In Strict Bullet Format?: No
Detail Level: Zone
Initiate Treatment: ———\\ncephalexin 500 mg capsule \\nTake 1 capsule by mouth twice a day x7 days with food

## 2025-06-04 NOTE — ED BEHAVIORAL HEALTH ASSESSMENT NOTE - PERCEPTIONS
-Stable with Breztri, as needed albuterol inhaler   Orders:    Follow Up In [facility] - PCP - Established; Future     No abnormalities